# Patient Record
Sex: MALE
[De-identification: names, ages, dates, MRNs, and addresses within clinical notes are randomized per-mention and may not be internally consistent; named-entity substitution may affect disease eponyms.]

---

## 2017-11-20 ENCOUNTER — APPOINTMENT (OUTPATIENT)
Dept: PLASTIC SURGERY | Facility: CLINIC | Age: 62
End: 2017-11-20
Payer: COMMERCIAL

## 2017-11-20 VITALS
SYSTOLIC BLOOD PRESSURE: 156 MMHG | DIASTOLIC BLOOD PRESSURE: 96 MMHG | HEIGHT: 68 IN | HEART RATE: 83 BPM | TEMPERATURE: 98.1 F | WEIGHT: 166 LBS | BODY MASS INDEX: 25.16 KG/M2

## 2017-11-20 DIAGNOSIS — Z78.9 OTHER SPECIFIED HEALTH STATUS: ICD-10-CM

## 2017-11-20 DIAGNOSIS — Z83.3 FAMILY HISTORY OF DIABETES MELLITUS: ICD-10-CM

## 2017-11-20 DIAGNOSIS — Z80.6 FAMILY HISTORY OF LEUKEMIA: ICD-10-CM

## 2017-11-20 DIAGNOSIS — Z87.19 PERSONAL HISTORY OF OTHER DISEASES OF THE DIGESTIVE SYSTEM: ICD-10-CM

## 2017-11-20 DIAGNOSIS — R73.03 PREDIABETES.: ICD-10-CM

## 2017-11-20 PROBLEM — Z00.00 ENCOUNTER FOR PREVENTIVE HEALTH EXAMINATION: Status: ACTIVE | Noted: 2017-11-20

## 2017-11-20 PROCEDURE — 99201 OFFICE OUTPATIENT NEW 10 MINUTES: CPT

## 2017-11-27 ENCOUNTER — LABORATORY RESULT (OUTPATIENT)
Age: 62
End: 2017-11-27

## 2017-11-27 ENCOUNTER — APPOINTMENT (OUTPATIENT)
Dept: PLASTIC SURGERY | Facility: CLINIC | Age: 62
End: 2017-11-27
Payer: COMMERCIAL

## 2017-11-27 PROCEDURE — 21011 EXC FACE LES SC <2 CM: CPT

## 2017-12-04 ENCOUNTER — APPOINTMENT (OUTPATIENT)
Dept: PLASTIC SURGERY | Facility: CLINIC | Age: 62
End: 2017-12-04
Payer: COMMERCIAL

## 2017-12-04 PROCEDURE — 99024 POSTOP FOLLOW-UP VISIT: CPT

## 2018-01-22 ENCOUNTER — APPOINTMENT (OUTPATIENT)
Dept: PLASTIC SURGERY | Facility: CLINIC | Age: 63
End: 2018-01-22
Payer: COMMERCIAL

## 2018-01-22 PROCEDURE — 99024 POSTOP FOLLOW-UP VISIT: CPT

## 2018-04-23 ENCOUNTER — APPOINTMENT (OUTPATIENT)
Dept: PLASTIC SURGERY | Facility: CLINIC | Age: 63
End: 2018-04-23
Payer: COMMERCIAL

## 2018-04-23 DIAGNOSIS — D49.89 NEOPLASM OF UNSPECIFIED BEHAVIOR OF OTHER SPECIFIED SITES: ICD-10-CM

## 2018-04-23 PROCEDURE — 99211 OFF/OP EST MAY X REQ PHY/QHP: CPT

## 2021-03-01 ENCOUNTER — TRANSCRIPTION ENCOUNTER (OUTPATIENT)
Age: 66
End: 2021-03-01

## 2023-08-25 ENCOUNTER — INPATIENT (INPATIENT)
Facility: HOSPITAL | Age: 68
LOS: 6 days | Discharge: HOME HEALTH | DRG: 871 | End: 2023-09-01
Attending: INTERNAL MEDICINE | Admitting: INTERNAL MEDICINE
Payer: COMMERCIAL

## 2023-08-25 VITALS
DIASTOLIC BLOOD PRESSURE: 86 MMHG | WEIGHT: 156.09 LBS | HEART RATE: 100 BPM | HEIGHT: 68 IN | RESPIRATION RATE: 18 BRPM | SYSTOLIC BLOOD PRESSURE: 126 MMHG | OXYGEN SATURATION: 94 % | TEMPERATURE: 100 F

## 2023-08-25 DIAGNOSIS — E04.1 NONTOXIC SINGLE THYROID NODULE: ICD-10-CM

## 2023-08-25 DIAGNOSIS — N28.9 DISORDER OF KIDNEY AND URETER, UNSPECIFIED: ICD-10-CM

## 2023-08-25 DIAGNOSIS — R74.01 ELEVATION OF LEVELS OF LIVER TRANSAMINASE LEVELS: ICD-10-CM

## 2023-08-25 DIAGNOSIS — K75.0 ABSCESS OF LIVER: ICD-10-CM

## 2023-08-25 DIAGNOSIS — A41.9 SEPSIS, UNSPECIFIED ORGANISM: ICD-10-CM

## 2023-08-25 LAB
ALBUMIN SERPL ELPH-MCNC: 4.1 G/DL — SIGNIFICANT CHANGE UP (ref 3.3–5)
ALP SERPL-CCNC: 386 U/L — HIGH (ref 40–120)
ALT FLD-CCNC: 107 U/L — HIGH (ref 10–45)
ANION GAP SERPL CALC-SCNC: 17 MMOL/L — SIGNIFICANT CHANGE UP (ref 5–17)
APPEARANCE UR: CLEAR — SIGNIFICANT CHANGE UP
APTT BLD: 31.6 SEC — SIGNIFICANT CHANGE UP (ref 24.5–35.6)
AST SERPL-CCNC: 49 U/L — HIGH (ref 10–40)
BACTERIA # UR AUTO: NEGATIVE — SIGNIFICANT CHANGE UP
BASE EXCESS BLDV CALC-SCNC: 4.8 MMOL/L — HIGH (ref -2–3)
BASOPHILS # BLD AUTO: 0.06 K/UL — SIGNIFICANT CHANGE UP (ref 0–0.2)
BASOPHILS NFR BLD AUTO: 0.4 % — SIGNIFICANT CHANGE UP (ref 0–2)
BILIRUB SERPL-MCNC: 0.6 MG/DL — SIGNIFICANT CHANGE UP (ref 0.2–1.2)
BILIRUB UR-MCNC: NEGATIVE — SIGNIFICANT CHANGE UP
BUN SERPL-MCNC: 14 MG/DL — SIGNIFICANT CHANGE UP (ref 7–23)
CA-I SERPL-SCNC: 1.15 MMOL/L — SIGNIFICANT CHANGE UP (ref 1.15–1.33)
CALCIUM SERPL-MCNC: 10.1 MG/DL — SIGNIFICANT CHANGE UP (ref 8.4–10.5)
CHLORIDE BLDV-SCNC: 96 MMOL/L — SIGNIFICANT CHANGE UP (ref 96–108)
CHLORIDE SERPL-SCNC: 91 MMOL/L — LOW (ref 96–108)
CO2 BLDV-SCNC: 31 MMOL/L — HIGH (ref 22–26)
CO2 SERPL-SCNC: 27 MMOL/L — SIGNIFICANT CHANGE UP (ref 22–31)
COLOR SPEC: SIGNIFICANT CHANGE UP
CREAT SERPL-MCNC: 1.19 MG/DL — SIGNIFICANT CHANGE UP (ref 0.5–1.3)
DIFF PNL FLD: NEGATIVE — SIGNIFICANT CHANGE UP
EGFR: 67 ML/MIN/1.73M2 — SIGNIFICANT CHANGE UP
EOSINOPHIL # BLD AUTO: 0.01 K/UL — SIGNIFICANT CHANGE UP (ref 0–0.5)
EOSINOPHIL NFR BLD AUTO: 0.1 % — SIGNIFICANT CHANGE UP (ref 0–6)
EPI CELLS # UR: 1 /HPF — SIGNIFICANT CHANGE UP
GAS PNL BLDV: 132 MMOL/L — LOW (ref 136–145)
GAS PNL BLDV: SIGNIFICANT CHANGE UP
GAS PNL BLDV: SIGNIFICANT CHANGE UP
GLUCOSE BLDV-MCNC: 152 MG/DL — HIGH (ref 70–99)
GLUCOSE SERPL-MCNC: 162 MG/DL — HIGH (ref 70–99)
GLUCOSE UR QL: NEGATIVE — SIGNIFICANT CHANGE UP
HCO3 BLDV-SCNC: 30 MMOL/L — HIGH (ref 22–29)
HCT VFR BLD CALC: 39 % — SIGNIFICANT CHANGE UP (ref 39–50)
HCT VFR BLDA CALC: 40 % — SIGNIFICANT CHANGE UP (ref 39–51)
HGB BLD CALC-MCNC: 13.3 G/DL — SIGNIFICANT CHANGE UP (ref 12.6–17.4)
HGB BLD-MCNC: 13 G/DL — SIGNIFICANT CHANGE UP (ref 13–17)
HIV 1 & 2 AB SERPL IA.RAPID: SIGNIFICANT CHANGE UP
IMM GRANULOCYTES NFR BLD AUTO: 1.4 % — HIGH (ref 0–0.9)
INR BLD: 1.23 RATIO — HIGH (ref 0.85–1.18)
KETONES UR-MCNC: SIGNIFICANT CHANGE UP
LACTATE BLDV-MCNC: 1.2 MMOL/L — SIGNIFICANT CHANGE UP (ref 0.5–2)
LEUKOCYTE ESTERASE UR-ACNC: NEGATIVE — SIGNIFICANT CHANGE UP
LYMPHOCYTES # BLD AUTO: 1.24 K/UL — SIGNIFICANT CHANGE UP (ref 1–3.3)
LYMPHOCYTES # BLD AUTO: 7.8 % — LOW (ref 13–44)
MCHC RBC-ENTMCNC: 28.1 PG — SIGNIFICANT CHANGE UP (ref 27–34)
MCHC RBC-ENTMCNC: 33.3 GM/DL — SIGNIFICANT CHANGE UP (ref 32–36)
MCV RBC AUTO: 84.4 FL — SIGNIFICANT CHANGE UP (ref 80–100)
MONOCYTES # BLD AUTO: 1.29 K/UL — HIGH (ref 0–0.9)
MONOCYTES NFR BLD AUTO: 8.2 % — SIGNIFICANT CHANGE UP (ref 2–14)
NEUTROPHILS # BLD AUTO: 13 K/UL — HIGH (ref 1.8–7.4)
NEUTROPHILS NFR BLD AUTO: 82.1 % — HIGH (ref 43–77)
NITRITE UR-MCNC: NEGATIVE — SIGNIFICANT CHANGE UP
NRBC # BLD: 0 /100 WBCS — SIGNIFICANT CHANGE UP (ref 0–0)
PCO2 BLDV: 45 MMHG — SIGNIFICANT CHANGE UP (ref 42–55)
PH BLDV: 7.43 — SIGNIFICANT CHANGE UP (ref 7.32–7.43)
PH UR: 6.5 — SIGNIFICANT CHANGE UP (ref 5–8)
PLATELET # BLD AUTO: 475 K/UL — HIGH (ref 150–400)
PO2 BLDV: 20 MMHG — LOW (ref 25–45)
POTASSIUM BLDV-SCNC: 3.5 MMOL/L — SIGNIFICANT CHANGE UP (ref 3.5–5.1)
POTASSIUM SERPL-MCNC: 3.5 MMOL/L — SIGNIFICANT CHANGE UP (ref 3.5–5.3)
POTASSIUM SERPL-SCNC: 3.5 MMOL/L — SIGNIFICANT CHANGE UP (ref 3.5–5.3)
PROT SERPL-MCNC: 8.7 G/DL — HIGH (ref 6–8.3)
PROT UR-MCNC: ABNORMAL
PROTHROM AB SERPL-ACNC: 13.4 SEC — HIGH (ref 9.5–13)
RAPID RVP RESULT: SIGNIFICANT CHANGE UP
RBC # BLD: 4.62 M/UL — SIGNIFICANT CHANGE UP (ref 4.2–5.8)
RBC # FLD: 13.2 % — SIGNIFICANT CHANGE UP (ref 10.3–14.5)
RBC CASTS # UR COMP ASSIST: 1 /HPF — SIGNIFICANT CHANGE UP (ref 0–4)
SAO2 % BLDV: 24 % — LOW (ref 67–88)
SARS-COV-2 RNA SPEC QL NAA+PROBE: SIGNIFICANT CHANGE UP
SODIUM SERPL-SCNC: 135 MMOL/L — SIGNIFICANT CHANGE UP (ref 135–145)
SP GR SPEC: 1.01 — LOW (ref 1.01–1.02)
TROPONIN T, HIGH SENSITIVITY RESULT: 18 NG/L — SIGNIFICANT CHANGE UP (ref 0–51)
UROBILINOGEN FLD QL: NEGATIVE — SIGNIFICANT CHANGE UP
WBC # BLD: 15.82 K/UL — HIGH (ref 3.8–10.5)
WBC # FLD AUTO: 15.82 K/UL — HIGH (ref 3.8–10.5)
WBC UR QL: 3 /HPF — SIGNIFICANT CHANGE UP (ref 0–5)

## 2023-08-25 PROCEDURE — 99285 EMERGENCY DEPT VISIT HI MDM: CPT

## 2023-08-25 PROCEDURE — 99223 1ST HOSP IP/OBS HIGH 75: CPT

## 2023-08-25 PROCEDURE — 71260 CT THORAX DX C+: CPT | Mod: 26

## 2023-08-25 PROCEDURE — 74177 CT ABD & PELVIS W/CONTRAST: CPT | Mod: 26

## 2023-08-25 RX ORDER — ACETAMINOPHEN 500 MG
975 TABLET ORAL ONCE
Refills: 0 | Status: COMPLETED | OUTPATIENT
Start: 2023-08-25 | End: 2023-08-25

## 2023-08-25 RX ORDER — PIPERACILLIN AND TAZOBACTAM 4; .5 G/20ML; G/20ML
3.38 INJECTION, POWDER, LYOPHILIZED, FOR SOLUTION INTRAVENOUS ONCE
Refills: 0 | Status: DISCONTINUED | OUTPATIENT
Start: 2023-08-25 | End: 2023-08-25

## 2023-08-25 RX ORDER — PIPERACILLIN AND TAZOBACTAM 4; .5 G/20ML; G/20ML
3.38 INJECTION, POWDER, LYOPHILIZED, FOR SOLUTION INTRAVENOUS ONCE
Refills: 0 | Status: COMPLETED | OUTPATIENT
Start: 2023-08-25 | End: 2023-08-25

## 2023-08-25 RX ORDER — PIPERACILLIN AND TAZOBACTAM 4; .5 G/20ML; G/20ML
3.38 INJECTION, POWDER, LYOPHILIZED, FOR SOLUTION INTRAVENOUS EVERY 8 HOURS
Refills: 0 | Status: DISCONTINUED | OUTPATIENT
Start: 2023-08-25 | End: 2023-08-30

## 2023-08-25 RX ORDER — SODIUM CHLORIDE 9 MG/ML
2100 INJECTION, SOLUTION INTRAVENOUS ONCE
Refills: 0 | Status: COMPLETED | OUTPATIENT
Start: 2023-08-25 | End: 2023-08-25

## 2023-08-25 RX ADMIN — Medication 975 MILLIGRAM(S): at 18:33

## 2023-08-25 RX ADMIN — PIPERACILLIN AND TAZOBACTAM 25 GRAM(S): 4; .5 INJECTION, POWDER, LYOPHILIZED, FOR SOLUTION INTRAVENOUS at 22:38

## 2023-08-25 RX ADMIN — PIPERACILLIN AND TAZOBACTAM 3.38 GRAM(S): 4; .5 INJECTION, POWDER, LYOPHILIZED, FOR SOLUTION INTRAVENOUS at 18:33

## 2023-08-25 RX ADMIN — PIPERACILLIN AND TAZOBACTAM 200 GRAM(S): 4; .5 INJECTION, POWDER, LYOPHILIZED, FOR SOLUTION INTRAVENOUS at 17:27

## 2023-08-25 RX ADMIN — SODIUM CHLORIDE 2100 MILLILITER(S): 9 INJECTION, SOLUTION INTRAVENOUS at 17:26

## 2023-08-25 RX ADMIN — SODIUM CHLORIDE 2100 MILLILITER(S): 9 INJECTION, SOLUTION INTRAVENOUS at 18:33

## 2023-08-25 RX ADMIN — Medication 975 MILLIGRAM(S): at 17:27

## 2023-08-25 NOTE — H&P ADULT - NSHPREVIEWOFSYSTEMS_GEN_ALL_CORE
REVIEW OF SYSTEMS:  CONSTITUTIONAL: +weakness. +fevers. +chills. No rigors. +poor appetite.  EYES: No blurry or double vision. No eye pain.  ENT: No hearing difficulty. No sore throat. No Sinusitis/rhinorrhea.   NECK: No pain. No stiffness/rigidity.  CARDIAC: No chest pain. No palpitations. No lightheadedness. No syncope.  RESPIRATORY: No cough. No SOB.   GASTROINTESTINAL: +abdominal pain. +nausea. No vomiting. No diarrhea. No constipation.   GENITOURINARY: No dysuria. No frequency. No oliguria.  NEUROLOGICAL: No numbness/tingling. No focal weakness. No headache. No unsteady gait.  BACK: No back pain. No flank pain.  EXTREMITIES: No lower extremity edema. Full ROM. No joint pain.  SKIN: No rashes. No itching. No other lesions.  All other review of systems is negative unless indicated above.  Unless indicated above, unable to assess ROS 2/2

## 2023-08-25 NOTE — H&P ADULT - HISTORY OF PRESENT ILLNESS
68M c no signif PMHx, pw 3 weeks of fatigue, and 2 weeks of epigastric abd pain, worsening fevers, night sweats, poor appetite.    Pt states he initially had right lower back pain but that self resolved. Starting 2 weeks ago, started to have low grade fevers, epigastric pain, night sweats, poor appetite, fatigue. All of his symptoms have been getting worse. Pt had outpt CT scan that reportedly showed "intrahepatic masses". Pt went to see his GI doctor today, and then was told by his PMD to come to the hospital. Pt reports living a "boring life". Pt denies travel out of the country, swimming in fresh water bodies, hiking, exotic pets, exotic hobbies. Pt states he has a dog, rarely eats red meat or sea food, denies drug use. Last colonoscopy was 3 yrs ago and reportedly normal.

## 2023-08-25 NOTE — H&P ADULT - PROBLEM SELECTOR PLAN 1
- complex cysts suspicious for liver abscesses  - unclear etiol  - will check hiv, quant gold, entambeba labs, blood cultures, cea, ca199  - needs ID consult in AM  - may need diagnostic IR drainage?  - cont empiric zosyn for now

## 2023-08-25 NOTE — H&P ADULT - NSHPLABSRESULTS_GEN_ALL_CORE
Personally reviewed old records.  Personally reviewed labs.  Personally reviewed imaging.  Personally reviewed EKG. sinus tach rate 102 qtc 437. no st or tw changes                          13.0   15.82 )-----------( 475      ( 25 Aug 2023 17:08 )             39.0       08    135  |  91<L>  |  14  ----------------------------<  162<H>  3.5   |  27  |  1.19    Ca    10.1      25 Aug 2023 17:08    TPro  8.7<H>  /  Alb  4.1  /  TBili  0.6  /  DBili  x   /  AST  49<H>  /  ALT  107<H>  /  AlkPhos  386<H>  08            LIVER FUNCTIONS - ( 25 Aug 2023 17:08 )  Alb: 4.1 g/dL / Pro: 8.7 g/dL / ALK PHOS: 386 U/L / ALT: 107 U/L / AST: 49 U/L / GGT: x             PT/INR - ( 25 Aug 2023 17:08 )   PT: 13.4 sec;   INR: 1.23 ratio         PTT - ( 25 Aug 2023 17:08 )  PTT:31.6 sec    Urinalysis Basic - ( 25 Aug 2023 19:54 )    Color: Light Yellow / Appearance: Clear / S.009 / pH: x  Gluc: x / Ketone: Trace  / Bili: Negative / Urobili: Negative   Blood: x / Protein: 30 mg/dL / Nitrite: Negative   Leuk Esterase: Negative / RBC: 1 /hpf / WBC 3 /HPF   Sq Epi: x / Non Sq Epi: x / Bacteria: Negative

## 2023-08-25 NOTE — ED PROVIDER NOTE - OBJECTIVE STATEMENT
William Corbett is a 68 y.o. M PMHx significant for HLD, who presents to the ED with c.o. epigastric pain, fatigue, and night sweats.  Per pt, symptoms initially started with back pain approximately 3 weeks ago that he localized to the R lower back.  He reports this pain did not radiate however was constant.  Pain has since decreased in intensity approximately 1 week following however, at that time developed sudden onset epigastric pain that has been constant, nonradiating, characterized as "dull", worse when laying down and rolling and has not had any particular alleviating factors.  During this time, Mr. Corbett reports increased fatigue and having a low-grade fever, with decreased appetite.  2/2 to ongoing symptoms, patient reported to his PCP who ordered CT A/P, positive for an "intrahepatic masses, largest in the lateral segment of the left hepatic lobe at 6.2 cm".  Today, while at his GI appointment, he was called by his PCP for elevated WBC and was recommended to present to the emergency department for management.

## 2023-08-25 NOTE — H&P ADULT - NSHPPHYSICALEXAM_GEN_ALL_CORE
PHYSICAL EXAM:   GENERAL: Alert. Not confused. No acute distress. Not thin. Not cachectic. Not obese.  HEAD:  Atraumatic. Normocephalic.  EYES: EOMI. PERRLA. Normal conjunctiva/sclera.  ENT: Neck supple. No JVD. Moist oral mucosa. Not edentulous. No thrush.  LYMPH: Normal supraclavicular/cervical lymph nodes.   CARDIAC: Not tachy, Not asa. Regular rhythm. Not irregularly irregular. S1. S2. No murmur. No rub. No distant heart sounds.  LUNG/CHEST: CTAB. BS equal bilaterally. No wheezes. No rales. No rhonchi.  ABDOMEN: Soft. No tenderness. No distension. No fluid wave. Normal bowel sounds.  BACK: No midline/vertebral tenderness. No flank tenderness.  VASCULAR: +2 b/l radial or ulnar pulses. Palpable DP pulses.  EXTREMITIES:  No clubbing. No cyanosis. No edema. Moving all 4.  NEUROLOGY: A&Ox3. Non-focal exam. Cranial nerves intact. Normal speech. Sensation intact.  PSYCH: Normal behavior. Normal affect.  SKIN: No jaundice. No erythema. No rash/lesion.  ICU Vital Signs Last 24 Hrs  T(C): 37 (25 Aug 2023 21:20), Max: 39.3 (25 Aug 2023 16:50)  T(F): 98.6 (25 Aug 2023 21:20), Max: 102.8 (25 Aug 2023 16:50)  HR: 82 (25 Aug 2023 21:20) (81 - 108)  BP: 121/75 (25 Aug 2023 21:20) (121/75 - 144/89)  BP(mean): --  ABP: --  ABP(mean): --  RR: 18 (25 Aug 2023 21:20) (18 - 22)  SpO2: 96% (25 Aug 2023 21:20) (94% - 97%)    O2 Parameters below as of 25 Aug 2023 21:20  Patient On (Oxygen Delivery Method): room air          I&O's Summary

## 2023-08-25 NOTE — ED ADULT NURSE NOTE - HOW MANY DRINKS CONTAINING ALCOHOL DO YOU HAVE ON A TYPICAL DAY WHEN YOU ARE DRINKING?
Herbie from [facility] [employer] left vm at 2:51 pm. They need a diagnosis code for the lancets, meter and the control solution that was ordered.  They are able to take a verbal order.    Phone is [phone]  
Spoke to Herbie regarding dx code.   
1 or 2

## 2023-08-25 NOTE — ED PROVIDER NOTE - PROGRESS NOTE DETAILS
Spoke to colleague of Dr. Beltran.  They confirmed sending patient here for work-up of sepsis.  No recommendations at this time.  Informed that they will follow him after discharge for hepatic work-up regarding liver masses. Spoke to colleague of PCP Ravindra Carlin. Informed of current status of patient and admission for work up of Sepsis. Will admit under Dr. Ricardo Christianson

## 2023-08-25 NOTE — ED PROVIDER NOTE - CLINICAL SUMMARY MEDICAL DECISION MAKING FREE TEXT BOX
William Corbett is a 68 y.o. M PMHx significant for HLD, who presents to the ED with c.o. epigastric pain, fatigue, and night sweats. William Corbett is a 68 y.o. M PMHx significant for HLD, who presents to the ED with c.o. epigastric pain, fatigue, and night sweats. Patient was sent in by PMD and GI physician for sepsis work-up given WBC count of 18 K.  At ED per SIRS criteria patient is septic and sepsis work-up initiated.  Appropriate analgesic, IV antibiotics, and normal saline given.  Patient will be admitted for further management of sepsis.

## 2023-08-25 NOTE — ED ADULT NURSE NOTE - NSFALLUNIVINTERV_ED_ALL_ED
Bed/Stretcher in lowest position, wheels locked, appropriate side rails in place/Call bell, personal items and telephone in reach/Instruct patient to call for assistance before getting out of bed/chair/stretcher/Non-slip footwear applied when patient is off stretcher/[place] to call system/Physically safe environment - no spills, clutter or unnecessary equipment/Purposeful proactive rounding/Room/bathroom lighting operational, light cord in reach

## 2023-08-25 NOTE — ED ADULT NURSE NOTE - OBJECTIVE STATEMENT
68M aaox4 ambulatory with h/o HLD and BPH, sent by PMD for an abnormal CT scan results of the Liver and also patient reports that he's been having some on and off fever accompanied by back and abd pain for more than 2 weeks now which prompted him to see an MD. Reports elevated WBC from PMD office. Patient takes tylenol for fever, abdomen soft tender in the mid epigastric area. Denies any SOB, chest pain or urinary symptoms.  2 large bore IV access inserted, sepsis labs sent pending results. IV antibiotics and fluids started. Plan of care explained and verbalized understanding.

## 2023-08-25 NOTE — ED PROVIDER NOTE - ATTENDING CONTRIBUTION TO CARE
Attending Statement (ANEESH Zacarias MD):    HPI: 69y/o M with h/o HLD, c/o mid/upper abdominal pain for the past 2 weeks; states pain most painful after laying down at night (states can't get comfortable). No CP/SOB, no SOB with walking/exertion. No nausea/vomiting.  Also reports increased fatigue and having night sweats/chills for the past 1 week, worse past few days.  few episodes of diarrhea after oral contrast for CT scan performed yesterday.  Prior colonoscopy 3 years ago, noted diverticulosis but no other problems noted. Told to come to hospital due to elevated WBC and abnormalities on CT scan (multiple lesions/collections in liver)    PCP: Ravindra Carlin ([facility])  GI: Danilo Beltran    Review of Systems:  -General: no fever or + chills  -ENT: no congestion, no difficulty swallowing  -Pulmonary: no cough, no shortness of breath  -Cardiac: no chest pain, no palpitations  -Gastrointestinal: + abdominal pain, no nausea, no vomiting, and no diarrhea.  -Genitourinary: no blood or pain with urination  -Musculoskeletal: no back or neck pain  -Skin: no rashes  -Endocrine: No h/o diabetes   -Neurologic: No new weakness or numbness in extremities    All else negative unless otherwise specified elsewhere in this note.    PSH/PMH as noted above    On Physical Exam:  General: well appearing, in NAD, speaking clearly in full sentences and without difficulty; cooperative with exam  HEENT: anicteric sclera, MMM  Neck: no JVD  Cardiac: normal s1, s2; RRR; no MGR  Lungs: CTABL  Abdomen: soft nontender/nondistended  : no bladder tenderness or distension  Skin: intact, no rash  Extremities: no peripheral edema, no gross deformities  Neuro: no gross neurologic deficits    MDM: concern for possibel liver abscess given outpatient CT findings (imaging not available); febrile, eleated WBC here, starting empiric treatment for presumed sepsis; sending labs/cultures, consider hiv testing to exclude; empiric zosyn and plan for admission for ongoign evaluation/mangaement, possible ir guided biopsy/sampling of liver lesions; consider ct chest to eval for masses.

## 2023-08-25 NOTE — H&P ADULT - NSHPSOCIALHISTORY_GEN_ALL_CORE
Social History:    Marital Status: (x ) , (  ) Single, (  ) , (  ) , (  )   # of Children: 1  Lives with: (  ) alone, (  ) children, ( x ) spouse, (  ) parents, (  ) siblings, (  ) friends, (  ) other:   Occupation:     Substance Use/Illicit Drugs: (  ) never used vs other:   Tobacco Usage: ( x ) never smoked, (  ) former smoker, (  ) current smoker and Total Pack-Years:   Last Alcohol Usage/Frequency/Amount/Withdrawal/Hx of Abuse:  none  Foreign travel:   Animal exposure:

## 2023-08-26 LAB
CULTURE RESULTS: SIGNIFICANT CHANGE UP
SPECIMEN SOURCE: SIGNIFICANT CHANGE UP

## 2023-08-26 RX ADMIN — PIPERACILLIN AND TAZOBACTAM 25 GRAM(S): 4; .5 INJECTION, POWDER, LYOPHILIZED, FOR SOLUTION INTRAVENOUS at 21:00

## 2023-08-26 RX ADMIN — PIPERACILLIN AND TAZOBACTAM 25 GRAM(S): 4; .5 INJECTION, POWDER, LYOPHILIZED, FOR SOLUTION INTRAVENOUS at 06:41

## 2023-08-26 RX ADMIN — PIPERACILLIN AND TAZOBACTAM 25 GRAM(S): 4; .5 INJECTION, POWDER, LYOPHILIZED, FOR SOLUTION INTRAVENOUS at 13:08

## 2023-08-26 NOTE — CONSULT NOTE ADULT - SUBJECTIVE AND OBJECTIVE BOX
[facility], Division of Infectious Diseases  DIAMOND Moore S. Shah, Y. Patel, G. Rodney  [phone]  ([phone] - weekdays after 5pm and weekends)    JADA EDWARDS  68y, Male  [id number]    HPI--  HPI:  69 y/o M w/ no significant PMhs who presented w/ epigastric abd pain x 2 weeks w/ associated fevers, night sweats, poor appetite. Reports initially having right lower back pain but that self resolved. 3 weeks ago he began feeling fatigued and 2 weeks ago fevers and epigastric which has progressively been worsening. Pt had outpt CT scan that reportedly showed "intrahepatic masses" and was informed to present to the ED. He denies eating raw or undercooked foods, recent travel, recent dental work, fresh water exposures, hiking, camping, fishing.   Last colonoscopy was 3 yrs ago and reportedly normal.  Denies chest pain, n/v, diarrhea, myalgias, arthralgias  symptoms.   Reports having issues w/ back pain every couple of years.    ROS: 10 point review of systems completed, pertinent positives and negatives as per HPI.    Allergies: No Known Allergies    PMH -- Hyperlipidemia      PSH --   FH --   Social History -- denies tobacco, alcohol or illicit drug use    Physical Exam--  Vital Signs Last 24 Hrs  T(F): 99.6 (26 Aug 2023 09:03), Max: 102.8 (25 Aug 2023 16:50)  HR: 98 (26 Aug 2023 09:03) (81 - 108)  BP: 137/75 (26 Aug 2023 09:03) (104/65 - 144/89)  RR: 18 (26 Aug 2023 09:03) (18 - 22)  SpO2: 98% (26 Aug 2023 09:03) (92% - 98%)  General: nontoxic-appearing, no acute distress  HEENT: NC/AT, anicteric, no frontal, maxillary or ethmoid sinus tenderness   Neck: Not rigid. No LAD  Lungs: Clear bilaterally without rales  Heart: S1, S2, normal rate.   Abdomen: Soft. Nondistended. Nontender.   Neuro: AAOx3, no obvious focal deficits   Back: No costovertebral angle tenderness.  Extremities: No LE edema.   Skin: Warm. Dry. No rash.  Psychiatric: Appropriate affect and mood for situation.     Laboratory & Imaging Data--  CBC:                       13.0   15.82 )-----------( 475      ( 25 Aug 2023 17:08 )             39.0     WBC Count: 15.82 K/uL (23 @ 17:08)    CMP:     135  |  91<L>  |  14  ----------------------------<  162<H>  3.5   |  27  |  1.19    Ca    10.1      25 Aug 2023 17:08    TPro  8.7<H>  /  Alb  4.1  /  TBili  0.6  /  DBili  x   /  AST  49<H>  /  ALT  107<H>  /  AlkPhos  386<H>      LIVER FUNCTIONS - ( 25 Aug 2023 17:08 )  Alb: 4.1 g/dL / Pro: 8.7 g/dL / ALK PHOS: 386 U/L / ALT: 107 U/L / AST: 49 U/L / GGT: x           Urinalysis Basic - ( 25 Aug 2023 19:54 )    Color: Light Yellow / Appearance: Clear / S.009 / pH: x  Gluc: x / Ketone: Trace  / Bili: Negative / Urobili: Negative   Blood: x / Protein: 30 mg/dL / Nitrite: Negative   Leuk Esterase: Negative / RBC: 1 /hpf / WBC 3 /HPF   Sq Epi: x / Non Sq Epi: x / Bacteria: Negative      Microbiology:       Radiology--  ***  Active Medications--  piperacillin/tazobactam IVPB.. 3.375 Gram(s) IV Intermittent every 8 hours    Antimicrobials:   piperacillin/tazobactam IVPB.. 3.375 Gram(s) IV Intermittent every 8 hours    Immunologic:

## 2023-08-26 NOTE — PROGRESS NOTE ADULT - ASSESSMENT
68M c no signif PMHx, pw sepsis 2/2 likely liver abscesses of unclear etiol    Sepsis  Liver abscess.   ·  Plan: - complex cysts suspicious for liver abscesses  - unclear etiol  - will check hiv, quant gold, entambeba labs, blood cultures, cea, ca199  - ID following   - IR consult placed   - cont empiric zosyn for now.    Transaminitis.   ·  Plan: - likely 2/2 liver lesions as above  - hold statin for now.    Thyroid nodule.   ·  Plan: - will check thyroid u/s, TFTs  - f/u pmd and endo as outpt.    Renal lesion.   ·  Plan: - check renal u/s  - f/u pmd and renal as outpt.    dvt ppx: lovenox   gi ppx: dajuan Christianson MD   [facility]  [phone]

## 2023-08-26 NOTE — PROGRESS NOTE ADULT - SUBJECTIVE AND OBJECTIVE BOX
Patient is a 68y old  Male who presents with a chief complaint of fever, night sweats, epigastric abd pain (26 Aug 2023 13:43)      SUBJECTIVE / OVERNIGHT EVENTS:  Patient seen and examined.   Still with night sweats      Vital Signs Last 24 Hrs  T(C): 37.6 (26 Aug 2023 09:03), Max: 39.3 (25 Aug 2023 16:50)  T(F): 99.6 (26 Aug 2023 09:03), Max: 102.8 (25 Aug 2023 16:50)  HR: 98 (26 Aug 2023 09:03) (81 - 108)  BP: 137/75 (26 Aug 2023 09:03) (104/65 - 144/89)  BP(mean): --  RR: 18 (26 Aug 2023 09:03) (18 - 22)  SpO2: 98% (26 Aug 2023 09:03) (92% - 98%)    Parameters below as of 26 Aug 2023 09:03  Patient On (Oxygen Delivery Method): room air      I&O's Summary      PHYSICAL EXAM:  GENERAL: NAD, AAOx3  HEAD:  Atraumatic, Normocephalic  EYES: EOMI; conjunctiva and sclera clear  NECK: Supple, No JVD, No LAD  CHEST/LUNG: B/L air entry; No wheezes, rales or rhonci   HEART: Regular rate and rhythm; No murmurs, rubs, or gallops  ABDOMEN: Soft, Nontender, Nondistended; Bowel sounds present  EXTREMITIES:  2+ Peripheral Pulses, No clubbing, cyanosis, or edema  SKIN: No rashes or lesions    LABS:                        13.0   15.82 )-----------( 475      ( 25 Aug 2023 17:08 )             39.0     08-25    135  |  91<L>  |  14  ----------------------------<  162<H>  3.5   |  27  |  1.19    Ca    10.1      25 Aug 2023 17:08    TPro  8.7<H>  /  Alb  4.1  /  TBili  0.6  /  DBili  x   /  AST  49<H>  /  ALT  107<H>  /  AlkPhos  386<H>  08-25    PT/INR - ( 25 Aug 2023 17:08 )   PT: 13.4 sec;   INR: 1.23 ratio         PTT - ( 25 Aug 2023 17:08 )  PTT:31.6 sec  CAPILLARY BLOOD GLUCOSE            Urinalysis Basic - ( 25 Aug 2023 19:54 )    Color: Light Yellow / Appearance: Clear / S.009 / pH: x  Gluc: x / Ketone: Trace  / Bili: Negative / Urobili: Negative   Blood: x / Protein: 30 mg/dL / Nitrite: Negative   Leuk Esterase: Negative / RBC: 1 /hpf / WBC 3 /HPF   Sq Epi: x / Non Sq Epi: x / Bacteria: Negative        RADIOLOGY & ADDITIONAL TESTS:    Imaging Personally Reviewed:  [x] YES  [ ] NO    Consultant(s) Notes Reviewed:  [x] YES  [ ] NO      MEDICATIONS  (STANDING):  piperacillin/tazobactam IVPB.. 3.375 Gram(s) IV Intermittent every 8 hours    MEDICATIONS  (PRN):      Care Discussed with Consultants/Other Providers [x] YES  [ ] NO    HEALTH ISSUES - PROBLEM Dx:  Liver abscess    Sepsis    Transaminitis    Thyroid nodule    Renal lesion

## 2023-08-26 NOTE — CHART NOTE - NSCHARTNOTEFT_GEN_A_CORE
68M no PMH with abdominal pain and fevers, leukocytosis, found to have likely abscess on CT. IR consulted for drainage    Afebrile and HD stable on IV antibiotics in hospital.     On ASA at home, last 10/24.    Given stability, can perofrm drainage after ASA has been held for 5 days.   plan for  above procedure Tuesday . can put order for IR procedure under Dr Halaibeh.  NPO AMN for sedation  please recheck CBC BMP Coags 4 AM    if patient demonstrates signs of acute hemodynamic compensation unresponsive to resuscitation requiring escalation of level of care, please contact IR for re-evaluation for more urgent intervention ([facility] [id number], [phone])         Alex Becerril MD   Interventional Radiology Chief resident/PGY6  Contact on [employer] Teams for nonemergent issues    - Nonemergent consults:  [facility] order "Consult- Interventional Radiology", no page required  - Emergent issues (pager): [facility] [phone]; [facility] [phone]; [phone]; DO NOT PAGE FOR SCHEDULING QUESTIONS  - Scheduling questions 8am-6pm : [facility] [phone]; [facility] [phone],   - Clinic/outpatient booking: [facility] [phone]; [facility] [phone]
Chart reviewed  liver abscesses  clinically stable  agree w abx and IR drainage/sampling  full consult to follow

## 2023-08-27 LAB
ALBUMIN SERPL ELPH-MCNC: 3.4 G/DL — SIGNIFICANT CHANGE UP (ref 3.3–5)
ALBUMIN SERPL ELPH-MCNC: 3.6 G/DL — SIGNIFICANT CHANGE UP (ref 3.3–5)
ALP SERPL-CCNC: 351 U/L — HIGH (ref 40–120)
ALP SERPL-CCNC: 362 U/L — HIGH (ref 40–120)
ALT FLD-CCNC: 103 U/L — HIGH (ref 10–45)
ALT FLD-CCNC: 112 U/L — HIGH (ref 10–45)
ANION GAP SERPL CALC-SCNC: 13 MMOL/L — SIGNIFICANT CHANGE UP (ref 5–17)
ANION GAP SERPL CALC-SCNC: 15 MMOL/L — SIGNIFICANT CHANGE UP (ref 5–17)
AST SERPL-CCNC: 63 U/L — HIGH (ref 10–40)
AST SERPL-CCNC: 69 U/L — HIGH (ref 10–40)
BASOPHILS # BLD AUTO: 0.07 K/UL — SIGNIFICANT CHANGE UP (ref 0–0.2)
BASOPHILS NFR BLD AUTO: 0.4 % — SIGNIFICANT CHANGE UP (ref 0–2)
BILIRUB SERPL-MCNC: 0.6 MG/DL — SIGNIFICANT CHANGE UP (ref 0.2–1.2)
BILIRUB SERPL-MCNC: 0.7 MG/DL — SIGNIFICANT CHANGE UP (ref 0.2–1.2)
BUN SERPL-MCNC: 10 MG/DL — SIGNIFICANT CHANGE UP (ref 7–23)
BUN SERPL-MCNC: 11 MG/DL — SIGNIFICANT CHANGE UP (ref 7–23)
CALCIUM SERPL-MCNC: 9.1 MG/DL — SIGNIFICANT CHANGE UP (ref 8.4–10.5)
CALCIUM SERPL-MCNC: 9.2 MG/DL — SIGNIFICANT CHANGE UP (ref 8.4–10.5)
CANCER AG19-9 SERPL-ACNC: 6 U/ML — SIGNIFICANT CHANGE UP
CEA SERPL-MCNC: 1 NG/ML — SIGNIFICANT CHANGE UP (ref 0–3.8)
CHLORIDE SERPL-SCNC: 96 MMOL/L — SIGNIFICANT CHANGE UP (ref 96–108)
CHLORIDE SERPL-SCNC: 97 MMOL/L — SIGNIFICANT CHANGE UP (ref 96–108)
CO2 SERPL-SCNC: 24 MMOL/L — SIGNIFICANT CHANGE UP (ref 22–31)
CO2 SERPL-SCNC: 26 MMOL/L — SIGNIFICANT CHANGE UP (ref 22–31)
CREAT SERPL-MCNC: 1.23 MG/DL — SIGNIFICANT CHANGE UP (ref 0.5–1.3)
CREAT SERPL-MCNC: 1.28 MG/DL — SIGNIFICANT CHANGE UP (ref 0.5–1.3)
EGFR: 62 ML/MIN/1.73M2 — SIGNIFICANT CHANGE UP
EGFR: 65 ML/MIN/1.73M2 — SIGNIFICANT CHANGE UP
EOSINOPHIL # BLD AUTO: 0.06 K/UL — SIGNIFICANT CHANGE UP (ref 0–0.5)
EOSINOPHIL NFR BLD AUTO: 0.4 % — SIGNIFICANT CHANGE UP (ref 0–6)
GLUCOSE SERPL-MCNC: 158 MG/DL — HIGH (ref 70–99)
GLUCOSE SERPL-MCNC: 166 MG/DL — HIGH (ref 70–99)
HAV IGM SER-ACNC: SIGNIFICANT CHANGE UP
HBV CORE IGM SER-ACNC: SIGNIFICANT CHANGE UP
HBV SURFACE AG SER-ACNC: SIGNIFICANT CHANGE UP
HCT VFR BLD CALC: 33.3 % — LOW (ref 39–50)
HCT VFR BLD CALC: 34.4 % — LOW (ref 39–50)
HCV AB S/CO SERPL IA: 0.07 S/CO — SIGNIFICANT CHANGE UP (ref 0–0.99)
HCV AB S/CO SERPL IA: 0.07 S/CO — SIGNIFICANT CHANGE UP (ref 0–0.99)
HCV AB SERPL-IMP: SIGNIFICANT CHANGE UP
HCV AB SERPL-IMP: SIGNIFICANT CHANGE UP
HGB BLD-MCNC: 10.9 G/DL — LOW (ref 13–17)
HGB BLD-MCNC: 11.4 G/DL — LOW (ref 13–17)
IMM GRANULOCYTES NFR BLD AUTO: 1.5 % — HIGH (ref 0–0.9)
LYMPHOCYTES # BLD AUTO: 1.45 K/UL — SIGNIFICANT CHANGE UP (ref 1–3.3)
LYMPHOCYTES # BLD AUTO: 9.1 % — LOW (ref 13–44)
MAGNESIUM SERPL-MCNC: 2.4 MG/DL — SIGNIFICANT CHANGE UP (ref 1.6–2.6)
MCHC RBC-ENTMCNC: 28.2 PG — SIGNIFICANT CHANGE UP (ref 27–34)
MCHC RBC-ENTMCNC: 28.3 PG — SIGNIFICANT CHANGE UP (ref 27–34)
MCHC RBC-ENTMCNC: 32.7 GM/DL — SIGNIFICANT CHANGE UP (ref 32–36)
MCHC RBC-ENTMCNC: 33.1 GM/DL — SIGNIFICANT CHANGE UP (ref 32–36)
MCV RBC AUTO: 85.1 FL — SIGNIFICANT CHANGE UP (ref 80–100)
MCV RBC AUTO: 86.5 FL — SIGNIFICANT CHANGE UP (ref 80–100)
MONOCYTES # BLD AUTO: 1.38 K/UL — HIGH (ref 0–0.9)
MONOCYTES NFR BLD AUTO: 8.7 % — SIGNIFICANT CHANGE UP (ref 2–14)
NEUTROPHILS # BLD AUTO: 12.68 K/UL — HIGH (ref 1.8–7.4)
NEUTROPHILS NFR BLD AUTO: 79.9 % — HIGH (ref 43–77)
NRBC # BLD: 0 /100 WBCS — SIGNIFICANT CHANGE UP (ref 0–0)
NRBC # BLD: 0 /100 WBCS — SIGNIFICANT CHANGE UP (ref 0–0)
PHOSPHATE SERPL-MCNC: 3.1 MG/DL — SIGNIFICANT CHANGE UP (ref 2.5–4.5)
PLATELET # BLD AUTO: 464 K/UL — HIGH (ref 150–400)
PLATELET # BLD AUTO: 540 K/UL — HIGH (ref 150–400)
POTASSIUM SERPL-MCNC: 3.8 MMOL/L — SIGNIFICANT CHANGE UP (ref 3.5–5.3)
POTASSIUM SERPL-MCNC: 3.8 MMOL/L — SIGNIFICANT CHANGE UP (ref 3.5–5.3)
POTASSIUM SERPL-SCNC: 3.8 MMOL/L — SIGNIFICANT CHANGE UP (ref 3.5–5.3)
POTASSIUM SERPL-SCNC: 3.8 MMOL/L — SIGNIFICANT CHANGE UP (ref 3.5–5.3)
PROT SERPL-MCNC: 7.4 G/DL — SIGNIFICANT CHANGE UP (ref 6–8.3)
PROT SERPL-MCNC: 7.6 G/DL — SIGNIFICANT CHANGE UP (ref 6–8.3)
RBC # BLD: 3.85 M/UL — LOW (ref 4.2–5.8)
RBC # BLD: 4.04 M/UL — LOW (ref 4.2–5.8)
RBC # FLD: 13.3 % — SIGNIFICANT CHANGE UP (ref 10.3–14.5)
RBC # FLD: 13.4 % — SIGNIFICANT CHANGE UP (ref 10.3–14.5)
SODIUM SERPL-SCNC: 135 MMOL/L — SIGNIFICANT CHANGE UP (ref 135–145)
SODIUM SERPL-SCNC: 136 MMOL/L — SIGNIFICANT CHANGE UP (ref 135–145)
TSH SERPL-MCNC: 2.07 UIU/ML — SIGNIFICANT CHANGE UP (ref 0.27–4.2)
WBC # BLD: 15.1 K/UL — HIGH (ref 3.8–10.5)
WBC # BLD: 15.88 K/UL — HIGH (ref 3.8–10.5)
WBC # FLD AUTO: 15.1 K/UL — HIGH (ref 3.8–10.5)
WBC # FLD AUTO: 15.88 K/UL — HIGH (ref 3.8–10.5)

## 2023-08-27 PROCEDURE — 76536 US EXAM OF HEAD AND NECK: CPT | Mod: 26

## 2023-08-27 PROCEDURE — 76770 US EXAM ABDO BACK WALL COMP: CPT | Mod: 26

## 2023-08-27 RX ORDER — ACETAMINOPHEN 500 MG
650 TABLET ORAL ONCE
Refills: 0 | Status: COMPLETED | OUTPATIENT
Start: 2023-08-27 | End: 2023-08-27

## 2023-08-27 RX ADMIN — PIPERACILLIN AND TAZOBACTAM 25 GRAM(S): 4; .5 INJECTION, POWDER, LYOPHILIZED, FOR SOLUTION INTRAVENOUS at 04:59

## 2023-08-27 RX ADMIN — PIPERACILLIN AND TAZOBACTAM 25 GRAM(S): 4; .5 INJECTION, POWDER, LYOPHILIZED, FOR SOLUTION INTRAVENOUS at 14:08

## 2023-08-27 RX ADMIN — Medication 650 MILLIGRAM(S): at 16:18

## 2023-08-27 RX ADMIN — PIPERACILLIN AND TAZOBACTAM 25 GRAM(S): 4; .5 INJECTION, POWDER, LYOPHILIZED, FOR SOLUTION INTRAVENOUS at 21:38

## 2023-08-27 RX ADMIN — Medication 650 MILLIGRAM(S): at 17:25

## 2023-08-27 NOTE — PROGRESS NOTE ADULT - SUBJECTIVE AND OBJECTIVE BOX
[facility], Division of Infectious Diseases  DIAMOND Moore Y. Patel, S. Shah, G. Rodney  [phone]  ([phone] - weekdays after 5pm and weekends)    Name: JADA EDWARDS  Age/Gender: 68y Male  MRN: [id number]    Interval History:  Notes reviewed.   No concerning overnight events.  Afebrile.   denies chills, reports epigastric pain has improved but is still present    Allergies: No Known Allergies      Objective:  Vitals:   T(F): 99.1 (08-27-23 @ 09:27), Max: 99.9 (08-27-23 @ 05:32)  HR: 94 (08-27-23 @ 09:27) (94 - 96)  BP: 122/77 (08-27-23 @ 05:32) (113/67 - 122/77)  RR: 18 (08-27-23 @ 09:27) (18 - 18)  SpO2: 96% (08-27-23 @ 09:27) (93% - 96%)  Physical Examination:  General: no acute distress  HEENT: anicteric  Cardio: S1, S2, normal rate  Resp: clear to auscultation bilaterally  Abd: soft, NT, ND  Ext: no LE edema  Skin: warm, dry    Laboratory Studies:  CBC:                       10.9   15.88 )-----------( 540      ( 27 Aug 2023 08:06 )             33.3     WBC Trend:  15.88 08-27-23 @ 08:06  15.10 08-27-23 @ 07:09  15.82 08-25-23 @ 17:08    CMP: 08-27    136  |  97  |  10  ----------------------------<  166<H>  3.8   |  26  |  1.28    Ca    9.2      27 Aug 2023 08:06  Phos  3.1     08-27  Mg     2.4     08-27    TPro  7.6  /  Alb  3.6  /  TBili  0.7  /  DBili  x   /  AST  69<H>  /  ALT  112<H>  /  AlkPhos  362<H>  08-27      LIVER FUNCTIONS - ( 27 Aug 2023 08:06 )  Alb: 3.6 g/dL / Pro: 7.6 g/dL / ALK PHOS: 362 U/L / ALT: 112 U/L / AST: 69 U/L / GGT: x             Urinalysis Basic - ( 27 Aug 2023 08:06 )    Color: x / Appearance: x / SG: x / pH: x  Gluc: 166 mg/dL / Ketone: x  / Bili: x / Urobili: x   Blood: x / Protein: x / Nitrite: x   Leuk Esterase: x / RBC: x / WBC x   Sq Epi: x / Non Sq Epi: x / Bacteria: x      Microbiology: reviewed     Culture - Urine (collected 08-25-23 @ 19:54)  Source: Clean Catch Clean Catch (Midstream)  Final Report (08-26-23 @ 21:25):    <10,000 CFU/mL Normal Urogenital Sunita    Culture - Blood (collected 08-25-23 @ 16:55)  Source: .Blood Blood-Peripheral  Preliminary Report (08-26-23 @ 22:02):    No growth at 24 hours    Culture - Blood (collected 08-25-23 @ 16:40)  Source: .Blood Blood-Peripheral  Preliminary Report (08-26-23 @ 22:02):    No growth at 24 hours        Radiology: reviewed     Medications:  piperacillin/tazobactam IVPB.. 3.375 Gram(s) IV Intermittent every 8 hours    Antimicrobials:  piperacillin/tazobactam IVPB.. 3.375 Gram(s) IV Intermittent every 8 hours

## 2023-08-27 NOTE — PROGRESS NOTE ADULT - ASSESSMENT
68M c no signif PMHx, pw sepsis 2/2 likely liver abscesses of unclear etiol    Sepsis  Liver abscess.   ·  Plan: - complex cysts suspicious for liver abscesses  - unclear etiol  - will check hiv, quant gold, entambeba labs, blood cultures  - CEA and CA 19-9 wnl-- less likely malignant   - ID and GI  following   - IR consult placed --- asa on hold; plan for drainage on 08/29 tuesday.   - cont empiric zosyn for now.    Transaminitis.   ·  Plan: - likely 2/2 liver lesions as above  - hold statin for now.    Thyroid nodule.   ·  Plan: - will check thyroid u/s, TFTs  - f/u pmd and endo as outpt.    Renal lesion.   ·  Plan: - check renal u/s  - f/u pmd and renal as outpt.    dvt ppx: lovenox   gi ppx: diet     Ricardo Christianson MD   [facility]  [phone]

## 2023-08-27 NOTE — CONSULT NOTE ADULT - SUBJECTIVE AND OBJECTIVE BOX
Chief Complaint:  Patient is a 68y old  Male who presents with a chief complaint of fever, night sweats, epigastric abd pain (26 Aug 2023 14:40)      Date of service: 08-27-23 @ 10:07    HPI:    The patient is a 68 year old man with chronic low back pain presenting with fevers, epigastric pain. He denies low abdominal pain.  His last colonoscopy was 3 years ago. Reportedly normal.    The patient denies dysphagia, nausea and vomiting,  diarrhea, unintentional weight loss, change in bowel habits or NSAID use.      Allergies:  No Known Allergies      Home Medications:    Hospital Medications:  piperacillin/tazobactam IVPB.. 3.375 Gram(s) IV Intermittent every 8 hours      PMHX/PSHX:  Hyperlipidemia        Family history:      Social History:   Denies ethanol use.  Denies illicit drug use.    ROS:     General:  + fever  Eyes:  Good vision, no reported pain  ENT:  No sore throat, pain, runny nose, dysphagia  CV:  No pain, palpitations, hypo/hypertension  Resp:  No dyspnea, cough, tachypnea, wheezing  GI:  See HPI  :  No pain, bleeding, incontinence, nocturia  Muscle:  No pain, weakness  Neuro:  No weakness, tingling, memory problems  Psych:  No fatigue, insomnia, mood problems, depression  Endocrine:  No polyuria, polydipsia, cold/heat intolerance  Heme:  No petechiae, ecchymosis, easy bruisability  Integumentary:  No rash, edema      PHYSICAL EXAM:     GENERAL:  Appears stated age, well-groomed, well-nourished, no distress  HEENT:  NC/AT,  conjunctivae anicteric, clear and pink,   NECK: supple, trachea midline  CHEST:  Full & symmetric excursion, no increased effort, breath sounds clear  HEART:  Regular rhythm, no JVD  ABDOMEN:  Soft, non-tender, non-distended, normoactive bowel sounds,  no masses , no hepatosplenomegaly  EXTREMITIES:  no cyanosis,clubbing or edema  SKIN:  No rash, erythema, or, ecchymoses, no jaundice  NEURO:  Alert, non-focal, no asterixis  PSYCH: Appropriate affect, oriented to place and time  RECTAL: Deferred      Vital Signs:  Vital Signs Last 24 Hrs  T(C): 37.3 (27 Aug 2023 09:27), Max: 37.7 (27 Aug 2023 05:32)  T(F): 99.1 (27 Aug 2023 09:27), Max: 99.9 (27 Aug 2023 05:32)  HR: 94 (27 Aug 2023 09:27) (94 - 96)  BP: 122/77 (27 Aug 2023 05:32) (113/67 - 122/77)  BP(mean): --  RR: 18 (27 Aug 2023 09:27) (18 - 18)  SpO2: 96% (27 Aug 2023 09:27) (93% - 96%)    Parameters below as of 27 Aug 2023 09:27  Patient On (Oxygen Delivery Method): room air      Daily     Daily     LABS: Labs personally reviewed by me:                        10.9   15.88 )-----------( 540      ( 27 Aug 2023 08:06 )             33.3     08-27    136  |  97  |  10  ----------------------------<  166<H>  3.8   |  26  |  1.28    Ca    9.2      27 Aug 2023 08:06  Phos  3.1     08-27  Mg     2.4     08-27    TPro  7.6  /  Alb  3.6  /  TBili  0.7  /  DBili  x   /  AST  69<H>  /  ALT  112<H>  /  AlkPhos  362<H>  08-27    LIVER FUNCTIONS - ( 27 Aug 2023 08:06 )  Alb: 3.6 g/dL / Pro: 7.6 g/dL / ALK PHOS: 362 U/L / ALT: 112 U/L / AST: 69 U/L / GGT: x           PT/INR - ( 25 Aug 2023 17:08 )   PT: 13.4 sec;   INR: 1.23 ratio         PTT - ( 25 Aug 2023 17:08 )  PTT:31.6 sec  Urinalysis Basic - ( 27 Aug 2023 08:06 )    Color: x / Appearance: x / SG: x / pH: x  Gluc: 166 mg/dL / Ketone: x  / Bili: x / Urobili: x   Blood: x / Protein: x / Nitrite: x   Leuk Esterase: x / RBC: x / WBC x   Sq Epi: x / Non Sq Epi: x / Bacteria: x          Imaging personally reviewed by me:

## 2023-08-27 NOTE — PROGRESS NOTE ADULT - SUBJECTIVE AND OBJECTIVE BOX
Patient is a 68y old  Male who presents with a chief complaint of fever, night sweats, epigastric abd pain (27 Aug 2023 10:32)      SUBJECTIVE / OVERNIGHT EVENTS:  Patient seen and examined.   NO complaints.       Vital Signs Last 24 Hrs  T(C): 37.3 (27 Aug 2023 09:27), Max: 37.7 (27 Aug 2023 05:32)  T(F): 99.1 (27 Aug 2023 09:27), Max: 99.9 (27 Aug 2023 05:32)  HR: 94 (27 Aug 2023 09:27) (94 - 96)  BP: 122/77 (27 Aug 2023 05:32) (113/67 - 122/77)  BP(mean): --  RR: 18 (27 Aug 2023 09:27) (18 - 18)  SpO2: 96% (27 Aug 2023 09:27) (93% - 96%)    Parameters below as of 27 Aug 2023 09:27  Patient On (Oxygen Delivery Method): room air      I&O's Summary    26 Aug 2023 07:01  -  27 Aug 2023 07:00  --------------------------------------------------------  IN: 100 mL / OUT: 1 mL / NET: 99 mL    27 Aug 2023 07:01  -  27 Aug 2023 14:43  --------------------------------------------------------  IN: 100 mL / OUT: 0 mL / NET: 100 mL        PHYSICAL EXAM:  GENERAL: NAD, AAOx3  HEAD:  Atraumatic, Normocephalic  EYES: EOMI; conjunctiva and sclera clear  NECK: Supple, No JVD, No LAD  CHEST/LUNG: B/L air entry; No wheezes, rales or rhonci   HEART: Regular rate and rhythm; No murmurs, rubs, or gallops  ABDOMEN: Soft, Nontender, Nondistended; Bowel sounds present  EXTREMITIES:  2+ Peripheral Pulses, No clubbing, cyanosis, or edema  SKIN: No rashes or lesions    LABS:                        10.9   15.88 )-----------( 540      ( 27 Aug 2023 08:06 )             33.3     08-27    136  |  97  |  10  ----------------------------<  166<H>  3.8   |  26  |  1.28    Ca    9.2      27 Aug 2023 08:06  Phos  3.1     08-27  Mg     2.4     08-27    TPro  7.6  /  Alb  3.6  /  TBili  0.7  /  DBili  x   /  AST  69<H>  /  ALT  112<H>  /  AlkPhos  362<H>  08-27    PT/INR - ( 25 Aug 2023 17:08 )   PT: 13.4 sec;   INR: 1.23 ratio         PTT - ( 25 Aug 2023 17:08 )  PTT:31.6 sec  CAPILLARY BLOOD GLUCOSE            Urinalysis Basic - ( 27 Aug 2023 08:06 )    Color: x / Appearance: x / SG: x / pH: x  Gluc: 166 mg/dL / Ketone: x  / Bili: x / Urobili: x   Blood: x / Protein: x / Nitrite: x   Leuk Esterase: x / RBC: x / WBC x   Sq Epi: x / Non Sq Epi: x / Bacteria: x        RADIOLOGY & ADDITIONAL TESTS:    Imaging Personally Reviewed:  [x] YES  [ ] NO    Consultant(s) Notes Reviewed:  [x] YES  [ ] NO      MEDICATIONS  (STANDING):  piperacillin/tazobactam IVPB.. 3.375 Gram(s) IV Intermittent every 8 hours    MEDICATIONS  (PRN):      Care Discussed with Consultants/Other Providers [x] YES  [ ] NO    HEALTH ISSUES - PROBLEM Dx:  Liver abscess    Sepsis    Transaminitis    Thyroid nodule    Renal lesion

## 2023-08-27 NOTE — CONSULT NOTE ADULT - ASSESSMENT
69 y/o M w/ no significant PMhs who presented w/ epigastric abd pain x 2 weeks w/ associated fevers, night sweats, poor appetite.    hepatic abscesses  leukocytosis, elevated liver enzymes  CT abd- complex hepatic cystic lesion measuring 5.8 cm with closer sign, suggestive of an abscess. additional cystic lesions in the left hepatic lobe measuring up to 3.2 cm likely additional abscesses.  Recommendations  c/w zosyn  f/u blood cultures  would consult IR for drainage & send for gram stain and cultures    Dinh Stevens M.D.  [facility], Division of Infectious Diseases  [phone]  After 5pm on weekdays and all day on weekends - please call [phone] 
68 year old man with fever, epgiastric pain, CT imaging demonstrates liver abscesses    1. Liver abscess. Unclear precipitating factor.  -broad spectrum antibiotics  -IR aspiration  -appreciate ID    2. Colonic diverticulosis. No indication of diverticulitis.    3. Leukocytosis        Advanced care planning forms were discussed. Code status including forceful chest compressions, defibrillation and intubation were discussed. The risks benefits and alternatives to pertinent gastrointestinal procedures and interventions were discussed in detail and all questions were answered. Duration: 15 Minutes.    [facility]  Luther Castellano M.D.   [address], [place]  Office: [phone]

## 2023-08-27 NOTE — PROGRESS NOTE ADULT - ASSESSMENT
69 y/o M w/ no significant PMhs who presented w/ epigastric abd pain x 2 weeks w/ associated fevers, night sweats, poor appetite.    hepatic abscesses  leukocytosis, elevated liver enzymes  CT abd- complex hepatic cystic lesion measuring 5.8 cm with closer sign, suggestive of an abscess. additional cystic lesions in the left hepatic lobe measuring up to 3.2 cm likely additional abscesses.  Recommendations  c/w zosyn  f/u blood cultures  IR for drainage, please send for gram stain and cultures  trend wbc    discussed w/ medicine attending    Dinh Stevens M.D.  [facility], Division of Infectious Diseases  [phone]  After 5pm on weekdays and all day on weekends - please call [phone]

## 2023-08-28 LAB
HCT VFR BLD CALC: 35.2 % — LOW (ref 39–50)
HGB BLD-MCNC: 11.7 G/DL — LOW (ref 13–17)
MCHC RBC-ENTMCNC: 28.3 PG — SIGNIFICANT CHANGE UP (ref 27–34)
MCHC RBC-ENTMCNC: 33.2 GM/DL — SIGNIFICANT CHANGE UP (ref 32–36)
MCV RBC AUTO: 85 FL — SIGNIFICANT CHANGE UP (ref 80–100)
NRBC # BLD: 0 /100 WBCS — SIGNIFICANT CHANGE UP (ref 0–0)
PLATELET # BLD AUTO: 496 K/UL — HIGH (ref 150–400)
RBC # BLD: 4.14 M/UL — LOW (ref 4.2–5.8)
RBC # FLD: 13.2 % — SIGNIFICANT CHANGE UP (ref 10.3–14.5)
WBC # BLD: 14.58 K/UL — HIGH (ref 3.8–10.5)
WBC # FLD AUTO: 14.58 K/UL — HIGH (ref 3.8–10.5)

## 2023-08-28 RX ORDER — CHLORHEXIDINE GLUCONATE 213 G/1000ML
1 SOLUTION TOPICAL DAILY
Refills: 0 | Status: DISCONTINUED | OUTPATIENT
Start: 2023-08-28 | End: 2023-09-01

## 2023-08-28 RX ADMIN — PIPERACILLIN AND TAZOBACTAM 25 GRAM(S): 4; .5 INJECTION, POWDER, LYOPHILIZED, FOR SOLUTION INTRAVENOUS at 21:18

## 2023-08-28 RX ADMIN — CHLORHEXIDINE GLUCONATE 1 APPLICATION(S): 213 SOLUTION TOPICAL at 14:24

## 2023-08-28 RX ADMIN — PIPERACILLIN AND TAZOBACTAM 25 GRAM(S): 4; .5 INJECTION, POWDER, LYOPHILIZED, FOR SOLUTION INTRAVENOUS at 05:41

## 2023-08-28 RX ADMIN — PIPERACILLIN AND TAZOBACTAM 25 GRAM(S): 4; .5 INJECTION, POWDER, LYOPHILIZED, FOR SOLUTION INTRAVENOUS at 14:23

## 2023-08-28 NOTE — PROGRESS NOTE ADULT - SUBJECTIVE AND OBJECTIVE BOX
Chief Complaint:  Patient is a 68y old  Male who presents with a chief complaint of fever, night sweats, epigastric abd pain (27 Aug 2023 14:43)      Date of service 08-28-23 @ 09:39      Interval Events:   low grade fever  Hospital Medications:  chlorhexidine 2% Cloths 1 Application(s) Topical daily  piperacillin/tazobactam IVPB.. 3.375 Gram(s) IV Intermittent every 8 hours        Review of Systems:  General:  No wt loss, fevers, chills, night sweats, fatigue,   Eyes:  Good vision, no reported pain  ENT:  No sore throat, pain, runny nose, dysphagia  CV:  No pain, palpitations, hypo/hypertension  Resp:  No dyspnea, cough, tachypnea, wheezing  GI:  See HPI  :  No pain, bleeding, incontinence, nocturia  Muscle:  No pain, weakness  Neuro:  No weakness, tingling, memory problems  Psych:  No fatigue, insomnia, mood problems, depression  Endocrine:  No polyuria, polydipsia, cold/heat intolerance  Heme:  No petechiae, ecchymosis, easy bruisability  Integumentary:  No rash, edema    PHYSICAL EXAM:   Vital Signs:  Vital Signs Last 24 Hrs  T(C): 37.5 (28 Aug 2023 06:54), Max: 38.1 (27 Aug 2023 15:08)  T(F): 99.5 (28 Aug 2023 06:54), Max: 100.5 (27 Aug 2023 15:08)  HR: 90 (28 Aug 2023 06:54) (90 - 95)  BP: 117/78 (28 Aug 2023 06:54) (117/78 - 124/73)  BP(mean): --  RR: 18 (28 Aug 2023 06:54) (18 - 18)  SpO2: 95% (28 Aug 2023 06:54) (95% - 95%)    Parameters below as of 28 Aug 2023 06:54  Patient On (Oxygen Delivery Method): room air      Daily     Daily       PHYSICAL EXAM:     GENERAL:  Appears stated age, well-groomed, well-nourished, no distress  HEENT:  NC/AT,  conjunctivae anicteric, clear and pink,   NECK: supple, trachea midline  CHEST:  Full & symmetric excursion, no increased effort, breath sounds clear  HEART:  Regular rhythm, no JVD  ABDOMEN:  Soft, non-tender, non-distended, normoactive bowel sounds,  no masses , no hepatosplenomegaly  EXTREMITIES:  no cyanosis,clubbing or edema  SKIN:  No rash, erythema, or, ecchymoses, no jaundice  NEURO:  Alert, non-focal, no asterixis  PSYCH: Appropriate affect, oriented to place and time  RECTAL: Deferred      LABS Personally reviewed by me:                        11.7   14.58 )-----------( 496      ( 28 Aug 2023 07:03 )             35.2     Mean Cell Volume: 85.0 fl (08-28-23 @ 07:03)    08-27    136  |  97  |  10  ----------------------------<  166<H>  3.8   |  26  |  1.28    Ca    9.2      27 Aug 2023 08:06  Phos  3.1     08-27  Mg     2.4     08-27    TPro  7.6  /  Alb  3.6  /  TBili  0.7  /  DBili  x   /  AST  69<H>  /  ALT  112<H>  /  AlkPhos  362<H>  08-27    LIVER FUNCTIONS - ( 27 Aug 2023 08:06 )  Alb: 3.6 g/dL / Pro: 7.6 g/dL / ALK PHOS: 362 U/L / ALT: 112 U/L / AST: 69 U/L / GGT: x             Urinalysis Basic - ( 27 Aug 2023 08:06 )    Color: x / Appearance: x / SG: x / pH: x  Gluc: 166 mg/dL / Ketone: x  / Bili: x / Urobili: x   Blood: x / Protein: x / Nitrite: x   Leuk Esterase: x / RBC: x / WBC x   Sq Epi: x / Non Sq Epi: x / Bacteria: x                              11.7   14.58 )-----------( 496      ( 28 Aug 2023 07:03 )             35.2                         10.9   15.88 )-----------( 540      ( 27 Aug 2023 08:06 )             33.3                         11.4   15.10 )-----------( 464      ( 27 Aug 2023 07:09 )             34.4                         13.0   15.82 )-----------( 475      ( 25 Aug 2023 17:08 )             39.0       Imaging personally reviewed by me:

## 2023-08-28 NOTE — PROGRESS NOTE ADULT - ASSESSMENT
67 y/o M w/ no significant PMhs who presented w/ epigastric abd pain x 2 weeks w/ associated fevers, night sweats, poor appetite.    hepatic abscesses, fever  leukocytosis, elevated liver enzymes  CT abd- complex hepatic cystic lesion measuring 5.8 cm with closer sign, suggestive of an abscess. additional cystic lesions in the left hepatic lobe measuring up to 3.2 cm likely additional abscesses.  Recommendations  c/w zosyn  f/u blood cultures- NGTD  tentative plan for IR aspiration 8/29  please send for gram stain and cultures    Dinh Stevens M.D.  [facility], Division of Infectious Diseases  [phone]  After 5pm on weekdays and all day on weekends - please call [phone]

## 2023-08-28 NOTE — PROGRESS NOTE ADULT - ASSESSMENT
1. Liver abscess. Unclear precipitating factor.  -broad spectrum antibiotics  -IR aspiration  -appreciate ID    2. Colonic diverticulosis. No indication of diverticulitis.    3. Leukocytosis

## 2023-08-28 NOTE — PROGRESS NOTE ADULT - SUBJECTIVE AND OBJECTIVE BOX
[facility], Division of Infectious Diseases  DIAMOND Moore Y. Patel, S. Shah, G. Rodney  [phone]  ([phone] - weekdays after 5pm and weekends)    Name: JADA EDWARDS  Age/Gender: 68y Male  MRN: [id number]    Interval History:  Notes reviewed.   No concerning overnight events.  Afebrile.   reports epigastric pain decreasing  no more chills    Allergies: No Known Allergies      Objective:  Vitals:   T(F): 99.5 (08-28-23 @ 06:54), Max: 100.5 (08-27-23 @ 15:08)  HR: 90 (08-28-23 @ 06:54) (90 - 95)  BP: 117/78 (08-28-23 @ 06:54) (117/78 - 124/73)  RR: 18 (08-28-23 @ 06:54) (18 - 18)  SpO2: 95% (08-28-23 @ 06:54) (95% - 95%)  Physical Examination:  General: no acute distress  HEENT: anicteric  Cardio: S1, S2, normal rate  Resp: clear to auscultation bilaterally  Abd: soft, NT, ND  Ext: no LE edema  Skin: warm, dry    Laboratory Studies:  CBC:                       11.7   14.58 )-----------( 496      ( 28 Aug 2023 07:03 )             35.2     WBC Trend:  14.58 08-28-23 @ 07:03  15.88 08-27-23 @ 08:06  15.10 08-27-23 @ 07:09  15.82 08-25-23 @ 17:08    CMP: 08-27    136  |  97  |  10  ----------------------------<  166<H>  3.8   |  26  |  1.28    Ca    9.2      27 Aug 2023 08:06  Phos  3.1     08-27  Mg     2.4     08-27    TPro  7.6  /  Alb  3.6  /  TBili  0.7  /  DBili  x   /  AST  69<H>  /  ALT  112<H>  /  AlkPhos  362<H>  08-27      LIVER FUNCTIONS - ( 27 Aug 2023 08:06 )  Alb: 3.6 g/dL / Pro: 7.6 g/dL / ALK PHOS: 362 U/L / ALT: 112 U/L / AST: 69 U/L / GGT: x             Urinalysis Basic - ( 27 Aug 2023 08:06 )    Color: x / Appearance: x / SG: x / pH: x  Gluc: 166 mg/dL / Ketone: x  / Bili: x / Urobili: x   Blood: x / Protein: x / Nitrite: x   Leuk Esterase: x / RBC: x / WBC x   Sq Epi: x / Non Sq Epi: x / Bacteria: x      Microbiology: reviewed     Culture - Urine (collected 08-25-23 @ 19:54)  Source: Clean Catch Clean Catch (Midstream)  Final Report (08-26-23 @ 21:25):    <10,000 CFU/mL Normal Urogenital Sunita    Culture - Blood (collected 08-25-23 @ 16:55)  Source: .Blood Blood-Peripheral  Preliminary Report (08-27-23 @ 22:03):    No growth at 48 Hours    Culture - Blood (collected 08-25-23 @ 16:40)  Source: .Blood Blood-Peripheral  Preliminary Report (08-27-23 @ 22:03):    No growth at 48 Hours        Radiology: reviewed     Medications:  chlorhexidine 2% Cloths 1 Application(s) Topical daily  piperacillin/tazobactam IVPB.. 3.375 Gram(s) IV Intermittent every 8 hours    Antimicrobials:  piperacillin/tazobactam IVPB.. 3.375 Gram(s) IV Intermittent every 8 hours   [facility], Division of Infectious Diseases  DIAMOND Moore Y. Patel, S. Shah, G. Rodney  [phone]  ([phone] - weekdays after 5pm and weekends)    Name: JADA EDWARDS  Age/Gender: 68y Male  MRN: [id number]    Interval History:  Notes reviewed.   No concerning overnight events.  febrile yesterday afternoon to 100.5  reports epigastric pain decreasing  no more chills    Allergies: No Known Allergies      Objective:  Vitals:   T(F): 99.5 (08-28-23 @ 06:54), Max: 100.5 (08-27-23 @ 15:08)  HR: 90 (08-28-23 @ 06:54) (90 - 95)  BP: 117/78 (08-28-23 @ 06:54) (117/78 - 124/73)  RR: 18 (08-28-23 @ 06:54) (18 - 18)  SpO2: 95% (08-28-23 @ 06:54) (95% - 95%)  Physical Examination:  General: no acute distress  HEENT: anicteric  Cardio: S1, S2, normal rate  Resp: clear to auscultation bilaterally  Abd: soft, NT, ND  Ext: no LE edema  Skin: warm, dry    Laboratory Studies:  CBC:                       11.7   14.58 )-----------( 496      ( 28 Aug 2023 07:03 )             35.2     WBC Trend:  14.58 08-28-23 @ 07:03  15.88 08-27-23 @ 08:06  15.10 08-27-23 @ 07:09  15.82 08-25-23 @ 17:08    CMP: 08-27    136  |  97  |  10  ----------------------------<  166<H>  3.8   |  26  |  1.28    Ca    9.2      27 Aug 2023 08:06  Phos  3.1     08-27  Mg     2.4     08-27    TPro  7.6  /  Alb  3.6  /  TBili  0.7  /  DBili  x   /  AST  69<H>  /  ALT  112<H>  /  AlkPhos  362<H>  08-27      LIVER FUNCTIONS - ( 27 Aug 2023 08:06 )  Alb: 3.6 g/dL / Pro: 7.6 g/dL / ALK PHOS: 362 U/L / ALT: 112 U/L / AST: 69 U/L / GGT: x             Urinalysis Basic - ( 27 Aug 2023 08:06 )    Color: x / Appearance: x / SG: x / pH: x  Gluc: 166 mg/dL / Ketone: x  / Bili: x / Urobili: x   Blood: x / Protein: x / Nitrite: x   Leuk Esterase: x / RBC: x / WBC x   Sq Epi: x / Non Sq Epi: x / Bacteria: x      Microbiology: reviewed     Culture - Urine (collected 08-25-23 @ 19:54)  Source: Clean Catch Clean Catch (Midstream)  Final Report (08-26-23 @ 21:25):    <10,000 CFU/mL Normal Urogenital Sunita    Culture - Blood (collected 08-25-23 @ 16:55)  Source: .Blood Blood-Peripheral  Preliminary Report (08-27-23 @ 22:03):    No growth at 48 Hours    Culture - Blood (collected 08-25-23 @ 16:40)  Source: .Blood Blood-Peripheral  Preliminary Report (08-27-23 @ 22:03):    No growth at 48 Hours        Radiology: reviewed     Medications:  chlorhexidine 2% Cloths 1 Application(s) Topical daily  piperacillin/tazobactam IVPB.. 3.375 Gram(s) IV Intermittent every 8 hours    Antimicrobials:  piperacillin/tazobactam IVPB.. 3.375 Gram(s) IV Intermittent every 8 hours

## 2023-08-28 NOTE — PROGRESS NOTE ADULT - SUBJECTIVE AND OBJECTIVE BOX
Patient is a 68y old  Male who presents with a chief complaint of fever, night sweats, epigastric abd pain (28 Aug 2023 09:38)      SUBJECTIVE / OVERNIGHT EVENTS:  Patient seen and examined.   Low grade fever overnight.       Vital Signs Last 24 Hrs  T(C): 37.5 (28 Aug 2023 06:54), Max: 38.1 (27 Aug 2023 15:08)  T(F): 99.5 (28 Aug 2023 06:54), Max: 100.5 (27 Aug 2023 15:08)  HR: 90 (28 Aug 2023 06:54) (90 - 95)  BP: 117/78 (28 Aug 2023 06:54) (117/78 - 124/73)  BP(mean): --  RR: 18 (28 Aug 2023 06:54) (18 - 18)  SpO2: 95% (28 Aug 2023 06:54) (95% - 95%)    Parameters below as of 28 Aug 2023 06:54  Patient On (Oxygen Delivery Method): room air      I&O's Summary    27 Aug 2023 07:01  -  28 Aug 2023 07:00  --------------------------------------------------------  IN: 100 mL / OUT: 2 mL / NET: 98 mL        PHYSICAL EXAM:  GENERAL: NAD, AAOx3  HEAD:  Atraumatic, Normocephalic  EYES: EOMI; conjunctiva and sclera clear  NECK: Supple, No JVD, No LAD  CHEST/LUNG: B/L air entry; No wheezes, rales or rhonci   HEART: Regular rate and rhythm; No murmurs, rubs, or gallops  ABDOMEN: Soft, Nontender, Nondistended; Bowel sounds present  EXTREMITIES:  2+ Peripheral Pulses, No clubbing, cyanosis, or edema  SKIN: No rashes or lesions    LABS:                        11.7   14.58 )-----------( 496      ( 28 Aug 2023 07:03 )             35.2     08-27    136  |  97  |  10  ----------------------------<  166<H>  3.8   |  26  |  1.28    Ca    9.2      27 Aug 2023 08:06  Phos  3.1     08-27  Mg     2.4     08-27    TPro  7.6  /  Alb  3.6  /  TBili  0.7  /  DBili  x   /  AST  69<H>  /  ALT  112<H>  /  AlkPhos  362<H>  08-27      CAPILLARY BLOOD GLUCOSE            Urinalysis Basic - ( 27 Aug 2023 08:06 )    Color: x / Appearance: x / SG: x / pH: x  Gluc: 166 mg/dL / Ketone: x  / Bili: x / Urobili: x   Blood: x / Protein: x / Nitrite: x   Leuk Esterase: x / RBC: x / WBC x   Sq Epi: x / Non Sq Epi: x / Bacteria: x        RADIOLOGY & ADDITIONAL TESTS:    Imaging Personally Reviewed:  [x] YES  [ ] NO    Consultant(s) Notes Reviewed:  [x] YES  [ ] NO      MEDICATIONS  (STANDING):  chlorhexidine 2% Cloths 1 Application(s) Topical daily  piperacillin/tazobactam IVPB.. 3.375 Gram(s) IV Intermittent every 8 hours    MEDICATIONS  (PRN):      Care Discussed with Consultants/Other Providers [x] YES  [ ] NO    HEALTH ISSUES - PROBLEM Dx:  Liver abscess    Sepsis    Transaminitis    Thyroid nodule    Renal lesion

## 2023-08-28 NOTE — PROGRESS NOTE ADULT - ASSESSMENT
68M c no signif PMHx, pw sepsis 2/2 likely liver abscesses of unclear etiol    Sepsis  Liver abscess.   ·  Plan: - complex cysts suspicious for liver abscesses  - unclear etiol  - will check hiv, quant gold, entambeba labs, blood cultures  - CEA and CA 19-9 wnl-- less likely malignant   - ID and GI  following   - IR consult placed --- asa on hold; plan for drainage on 08/29 tuesday.   - cont empiric zosyn for now.    Transaminitis.   ·  Plan: - likely 2/2 liver lesions as above  - hold statin for now.    Thyroid nodule.   -complex isthmus nodule on ultrasound  - f/u pmd and endo as outpt- for  possible FNA , d/w patient.    Renal lesion.   - renal ult with cyst - benign   - f/u pmd and renal as outpt.    dvt ppx: lovenox   gi ppx: dajuan Christianson MD   [facility]  [phone]

## 2023-08-29 LAB
HCT VFR BLD CALC: 36.3 % — LOW (ref 39–50)
HGB BLD-MCNC: 11.6 G/DL — LOW (ref 13–17)
MCHC RBC-ENTMCNC: 27.8 PG — SIGNIFICANT CHANGE UP (ref 27–34)
MCHC RBC-ENTMCNC: 32 GM/DL — SIGNIFICANT CHANGE UP (ref 32–36)
MCV RBC AUTO: 87.1 FL — SIGNIFICANT CHANGE UP (ref 80–100)
NRBC # BLD: 0 /100 WBCS — SIGNIFICANT CHANGE UP (ref 0–0)
PLATELET # BLD AUTO: 529 K/UL — HIGH (ref 150–400)
RBC # BLD: 4.17 M/UL — LOW (ref 4.2–5.8)
RBC # FLD: 13.2 % — SIGNIFICANT CHANGE UP (ref 10.3–14.5)
WBC # BLD: 14.93 K/UL — HIGH (ref 3.8–10.5)
WBC # FLD AUTO: 14.93 K/UL — HIGH (ref 3.8–10.5)

## 2023-08-29 PROCEDURE — 49405 IMAGE CATH FLUID COLXN VISC: CPT

## 2023-08-29 RX ADMIN — PIPERACILLIN AND TAZOBACTAM 25 GRAM(S): 4; .5 INJECTION, POWDER, LYOPHILIZED, FOR SOLUTION INTRAVENOUS at 05:20

## 2023-08-29 RX ADMIN — PIPERACILLIN AND TAZOBACTAM 25 GRAM(S): 4; .5 INJECTION, POWDER, LYOPHILIZED, FOR SOLUTION INTRAVENOUS at 23:05

## 2023-08-29 NOTE — PROGRESS NOTE ADULT - ASSESSMENT
68M c no signif PMHx, pw sepsis 2/2 likely liver abscesses of unclear etiol    Sepsis  Liver abscess.   ·  Plan: - complex cysts suspicious for liver abscesses  - unclear etiol  - f/u  hiv, quant gold, entambeba labs, blood cultures  - CEA and CA 19-9 wnl-- less likely malignant   - ID and GI  following   - IR consult placed --- asa on hold; plan for drainage today.   - cont empiric zosyn for now.    Transaminitis.   ·  Plan: - likely 2/2 liver lesions as above  - hold statin for now.    Thyroid nodule.   -complex isthmus nodule on ultrasound  - f/u pmd and endo as outpt- for  possible FNA , d/w patient.    Renal lesion.   - renal ult with cyst - benign   - f/u pmd and renal as outpt.    dvt ppx: lovenox   gi ppx: dajuan Christianson MD   [facility]  [phone]

## 2023-08-29 NOTE — PROGRESS NOTE ADULT - ASSESSMENT
67 y/o M w/ no significant PMhs who presented w/ epigastric abd pain x 2 weeks w/ associated fevers, night sweats, poor appetite.    hepatic abscesses, fever  leukocytosis, elevated liver enzymes  CT abd- complex hepatic cystic lesion measuring 5.8 cm with closer sign, suggestive of an abscess. additional cystic lesions in the left hepatic lobe measuring up to 3.2 cm likely additional abscesses.  blood cultures- NGTD  s/p IR drain placement 8/29  Recommendations  c/w zosyn  f/u IR cultures    Dinh Stevens M.D.  [facility], Division of Infectious Diseases  [phone]  After 5pm on weekdays and all day on weekends - please call [phone]  67 y/o M w/ no significant PMhs who presented w/ epigastric abd pain x 2 weeks w/ associated fevers, night sweats, poor appetite.    hepatic abscesses, fever  leukocytosis, elevated liver enzymes  CT abd- complex hepatic cystic lesion measuring 5.8 cm with closer sign, suggestive of an abscess. additional cystic lesions in the left hepatic lobe measuring up to 3.2 cm likely additional abscesses.  blood cultures- NGTD  s/p IR drain placement 8/29  Recommendations  c/w zosyn  f/u IR cultures    discussed w/ IR and medicine NP  Dinh Stevens M.D.  [facility], Division of Infectious Diseases  [phone]  After 5pm on weekdays and all day on weekends - please call [phone]  69 y/o M w/ no significant PMhs who presented w/ epigastric abd pain x 2 weeks w/ associated fevers, night sweats, poor appetite.    hepatic abscesses, fever  leukocytosis, elevated liver enzymes  CT abd- complex hepatic cystic lesion measuring 5.8 cm with closer sign, suggestive of an abscess. additional cystic lesions in the left hepatic lobe measuring up to 3.2 cm likely additional abscesses.  blood cultures- NGTD  s/p IR drain placement 8/29  Recommendations  c/w zosyn  ensure cultures from drainage were sent    discussed w/ IR and medicine NP  Dinh Stevens M.D.  [facility], Division of Infectious Diseases  [phone]  After 5pm on weekdays and all day on weekends - please call [phone]

## 2023-08-29 NOTE — PROGRESS NOTE ADULT - SUBJECTIVE AND OBJECTIVE BOX
Chief Complaint:  Patient is a 68y old  Male who presents with a chief complaint of fever, night sweats, epigastric abd pain (29 Aug 2023 14:00)      Date of service 08-29-23 @ 16:00      Interval Events:   s/p IR drain today    Hospital Medications:  chlorhexidine 2% Cloths 1 Application(s) Topical daily  piperacillin/tazobactam IVPB.. 3.375 Gram(s) IV Intermittent every 8 hours        Review of Systems:  General:  No wt loss, fevers, chills, night sweats, fatigue,   Eyes:  Good vision, no reported pain  ENT:  No sore throat, pain, runny nose, dysphagia  CV:  No pain, palpitations, hypo/hypertension  Resp:  No dyspnea, cough, tachypnea, wheezing  GI:  See HPI  :  No pain, bleeding, incontinence, nocturia  Muscle:  No pain, weakness  Neuro:  No weakness, tingling, memory problems  Psych:  No fatigue, insomnia, mood problems, depression  Endocrine:  No polyuria, polydipsia, cold/heat intolerance  Heme:  No petechiae, ecchymosis, easy bruisability  Integumentary:  No rash, edema    PHYSICAL EXAM:   Vital Signs:  Vital Signs Last 24 Hrs  T(C): 38.2 (29 Aug 2023 15:13), Max: 38.2 (29 Aug 2023 15:13)  T(F): 100.7 (29 Aug 2023 15:13), Max: 100.7 (29 Aug 2023 15:13)  HR: 99 (29 Aug 2023 15:13) (85 - 99)  BP: 124/80 (29 Aug 2023 15:13) (98/64 - 124/80)  BP(mean): 86 (29 Aug 2023 14:30) (82 - 89)  RR: 18 (29 Aug 2023 15:13) (15 - 20)  SpO2: 97% (29 Aug 2023 15:13) (92% - 99%)    Parameters below as of 29 Aug 2023 15:13  Patient On (Oxygen Delivery Method): room air      Daily Height in cm: 172.72 (29 Aug 2023 12:35)    Daily       PHYSICAL EXAM:     GENERAL:  Appears stated age, well-groomed, well-nourished, no distress  HEENT:  NC/AT,  conjunctivae anicteric, clear and pink,   NECK: supple, trachea midline  CHEST:  Full & symmetric excursion, no increased effort, breath sounds clear  HEART:  Regular rhythm, no JVD  ABDOMEN:  Soft, non-tender, non-distended, normoactive bowel sounds,  no masses , no hepatosplenomegaly  EXTREMITIES:  no cyanosis,clubbing or edema  SKIN:  No rash, erythema, or, ecchymoses, no jaundice  NEURO:  Alert, non-focal, no asterixis  PSYCH: Appropriate affect, oriented to place and time  RECTAL: Deferred      LABS Personally reviewed by me:                        11.6   14.93 )-----------( 529      ( 29 Aug 2023 07:29 )             36.3     Mean Cell Volume: 87.1 fl (08-29-23 @ 07:29)                                        11.6   14.93 )-----------( 529      ( 29 Aug 2023 07:29 )             36.3                         11.7   14.58 )-----------( 496      ( 28 Aug 2023 07:03 )             35.2                         10.9   15.88 )-----------( 540      ( 27 Aug 2023 08:06 )             33.3                         11.4   15.10 )-----------( 464      ( 27 Aug 2023 07:09 )             34.4       Imaging personally reviewed by me:

## 2023-08-29 NOTE — PRE-ANESTHESIA EVALUATION ADULT - NSANTHOSAYNRD_GEN_A_CORE
No. SINA screening performed.  STOP BANG Legend: 0-2 = LOW Risk; 3-4 = INTERMEDIATE Risk; 5-8 = HIGH Risk

## 2023-08-29 NOTE — PROGRESS NOTE ADULT - ASSESSMENT
1. Liver abscess. Unclear precipitating factor. No obvious biliary pathology on CT. s/p IR drain today.   - abx per ID   - recc outpatient colonoscopy in 6-8 weeks     2. Colonic diverticulosis. No e/o diverticulitis.    3. Leukocytosis

## 2023-08-29 NOTE — PROGRESS NOTE ADULT - SUBJECTIVE AND OBJECTIVE BOX
[facility], Division of Infectious Diseases  DIAMOND Moore Y. Patel, S. Shah, G. Rodney  [phone]  ([phone] - weekdays after 5pm and weekends)    Name: JADA EDWARDS  Age/Gender: 68y Male  MRN: [id number]    Interval History:  Notes reviewed.   No concerning overnight events.  Afebrile. Tmax 100.3  has IR drain placed today  currently he has no abd pain    Allergies: No Known Allergies      Objective:  Vitals:   T(F): 98.3 (08-29-23 @ 11:03), Max: 100.3 (08-28-23 @ 17:45)  HR: 89 (08-29-23 @ 12:35) (89 - 96)  BP: 113/72 (08-29-23 @ 12:35) (98/64 - 117/68)  RR: 18 (08-29-23 @ 12:35) (16 - 18)  SpO2: 97% (08-29-23 @ 12:35) (92% - 98%)  Physical Examination:  General: no acute distress  HEENT: anicteric  Cardio: S1, S2, normal rate  Resp: clear to auscultation bilaterally  Abd: soft, NT, ND, abd drain  Ext: no LE edema  Skin: warm, dry    Laboratory Studies:  CBC:                       11.6   14.93 )-----------( 529      ( 29 Aug 2023 07:29 )             36.3     WBC Trend:  14.93 08-29-23 @ 07:29  14.58 08-28-23 @ 07:03  15.88 08-27-23 @ 08:06  15.10 08-27-23 @ 07:09  15.82 08-25-23 @ 17:08    CMP:               Microbiology: reviewed     Culture - Urine (collected 08-25-23 @ 19:54)  Source: Clean Catch Clean Catch (Midstream)  Final Report (08-26-23 @ 21:25):    <10,000 CFU/mL Normal Urogenital Sunita    Culture - Blood (collected 08-25-23 @ 16:55)  Source: .Blood Blood-Peripheral  Preliminary Report (08-28-23 @ 22:02):    No growth at 72 Hours    Culture - Blood (collected 08-25-23 @ 16:40)  Source: .Blood Blood-Peripheral  Preliminary Report (08-28-23 @ 22:02):    No growth at 72 Hours        Radiology: reviewed     Medications:  chlorhexidine 2% Cloths 1 Application(s) Topical daily  piperacillin/tazobactam IVPB.. 3.375 Gram(s) IV Intermittent every 8 hours    Antimicrobials:  piperacillin/tazobactam IVPB.. 3.375 Gram(s) IV Intermittent every 8 hours

## 2023-08-29 NOTE — PROGRESS NOTE ADULT - SUBJECTIVE AND OBJECTIVE BOX
Patient is a 68y old  Male who presents with a chief complaint of fever, night sweats, epigastric abd pain (28 Aug 2023 14:44)      SUBJECTIVE / OVERNIGHT EVENTS:  Patient seen and examined.   no complaints.       Vital Signs Last 24 Hrs  T(C): 36.8 (29 Aug 2023 12:35), Max: 37.9 (28 Aug 2023 17:45)  T(F): 98.3 (29 Aug 2023 11:03), Max: 100.3 (28 Aug 2023 17:45)  HR: 89 (29 Aug 2023 12:35) (89 - 96)  BP: 113/72 (29 Aug 2023 12:35) (98/64 - 117/68)  BP(mean): --  RR: 18 (29 Aug 2023 12:35) (16 - 18)  SpO2: 97% (29 Aug 2023 12:35) (92% - 98%)    Parameters below as of 29 Aug 2023 11:03  Patient On (Oxygen Delivery Method): room air      I&O's Summary      PHYSICAL EXAM:  GENERAL: NAD, AAOx3  HEAD:  Atraumatic, Normocephalic  EYES: EOMI; conjunctiva and sclera clear  NECK: Supple, No JVD, No LAD  CHEST/LUNG: B/L air entry; No wheezes, rales or rhonci   HEART: Regular rate and rhythm; No murmurs, rubs, or gallops  ABDOMEN: Soft, Nontender, Nondistended; Bowel sounds present  EXTREMITIES:  2+ Peripheral Pulses, No clubbing, cyanosis, or edema  SKIN: No rashes or lesions    LABS:                        11.6   14.93 )-----------( 529      ( 29 Aug 2023 07:29 )             36.3             CAPILLARY BLOOD GLUCOSE                RADIOLOGY & ADDITIONAL TESTS:    Imaging Personally Reviewed:  [x] YES  [ ] NO    Consultant(s) Notes Reviewed:  [x] YES  [ ] NO      MEDICATIONS  (STANDING):  chlorhexidine 2% Cloths 1 Application(s) Topical daily  piperacillin/tazobactam IVPB.. 3.375 Gram(s) IV Intermittent every 8 hours    MEDICATIONS  (PRN):      Care Discussed with Consultants/Other Providers [x] YES  [ ] NO    HEALTH ISSUES - PROBLEM Dx:  Liver abscess    Sepsis    Transaminitis    Thyroid nodule    Renal lesion

## 2023-08-30 LAB
ALBUMIN SERPL ELPH-MCNC: 3.3 G/DL — SIGNIFICANT CHANGE UP (ref 3.3–5)
ALP SERPL-CCNC: 411 U/L — HIGH (ref 40–120)
ALT FLD-CCNC: 177 U/L — HIGH (ref 10–45)
ANION GAP SERPL CALC-SCNC: 14 MMOL/L — SIGNIFICANT CHANGE UP (ref 5–17)
AST SERPL-CCNC: 91 U/L — HIGH (ref 10–40)
BASOPHILS # BLD AUTO: 0.11 K/UL — SIGNIFICANT CHANGE UP (ref 0–0.2)
BASOPHILS NFR BLD AUTO: 0.9 % — SIGNIFICANT CHANGE UP (ref 0–2)
BILIRUB SERPL-MCNC: 0.5 MG/DL — SIGNIFICANT CHANGE UP (ref 0.2–1.2)
BUN SERPL-MCNC: 12 MG/DL — SIGNIFICANT CHANGE UP (ref 7–23)
CALCIUM SERPL-MCNC: 9.3 MG/DL — SIGNIFICANT CHANGE UP (ref 8.4–10.5)
CHLORIDE SERPL-SCNC: 95 MMOL/L — LOW (ref 96–108)
CO2 SERPL-SCNC: 25 MMOL/L — SIGNIFICANT CHANGE UP (ref 22–31)
CREAT SERPL-MCNC: 1.39 MG/DL — HIGH (ref 0.5–1.3)
CULTURE RESULTS: SIGNIFICANT CHANGE UP
CULTURE RESULTS: SIGNIFICANT CHANGE UP
E HISTOLYT AG STL IA-ACNC: SIGNIFICANT CHANGE UP
EGFR: 56 ML/MIN/1.73M2 — LOW
EOSINOPHIL # BLD AUTO: 0.11 K/UL — SIGNIFICANT CHANGE UP (ref 0–0.5)
EOSINOPHIL NFR BLD AUTO: 0.9 % — SIGNIFICANT CHANGE UP (ref 0–6)
GAMMA INTERFERON BACKGROUND BLD IA-ACNC: 0.01 IU/ML — SIGNIFICANT CHANGE UP
GLUCOSE SERPL-MCNC: 148 MG/DL — HIGH (ref 70–99)
GRAM STN FLD: SIGNIFICANT CHANGE UP
HCT VFR BLD CALC: 36.8 % — LOW (ref 39–50)
HGB BLD-MCNC: 12 G/DL — LOW (ref 13–17)
LYMPHOCYTES # BLD AUTO: 0.44 K/UL — LOW (ref 1–3.3)
LYMPHOCYTES # BLD AUTO: 3.5 % — LOW (ref 13–44)
M TB IFN-G BLD-IMP: NEGATIVE — SIGNIFICANT CHANGE UP
M TB IFN-G CD4+ BCKGRND COR BLD-ACNC: 0 IU/ML — SIGNIFICANT CHANGE UP
M TB IFN-G CD4+CD8+ BCKGRND COR BLD-ACNC: 0 IU/ML — SIGNIFICANT CHANGE UP
MANUAL SMEAR VERIFICATION: SIGNIFICANT CHANGE UP
MCHC RBC-ENTMCNC: 28.2 PG — SIGNIFICANT CHANGE UP (ref 27–34)
MCHC RBC-ENTMCNC: 32.6 GM/DL — SIGNIFICANT CHANGE UP (ref 32–36)
MCV RBC AUTO: 86.4 FL — SIGNIFICANT CHANGE UP (ref 80–100)
MONOCYTES # BLD AUTO: 1.35 K/UL — HIGH (ref 0–0.9)
MONOCYTES NFR BLD AUTO: 10.6 % — SIGNIFICANT CHANGE UP (ref 2–14)
MYELOCYTES NFR BLD: 1.8 % — HIGH (ref 0–0)
NEUTROPHILS # BLD AUTO: 10.22 K/UL — HIGH (ref 1.8–7.4)
NEUTROPHILS NFR BLD AUTO: 80.5 % — HIGH (ref 43–77)
PLAT MORPH BLD: ABNORMAL
PLATELET # BLD AUTO: 533 K/UL — HIGH (ref 150–400)
POTASSIUM SERPL-MCNC: 4.5 MMOL/L — SIGNIFICANT CHANGE UP (ref 3.5–5.3)
POTASSIUM SERPL-SCNC: 4.5 MMOL/L — SIGNIFICANT CHANGE UP (ref 3.5–5.3)
PROT SERPL-MCNC: 7.8 G/DL — SIGNIFICANT CHANGE UP (ref 6–8.3)
QUANT TB PLUS MITOGEN MINUS NIL: 0.79 IU/ML — SIGNIFICANT CHANGE UP
RBC # BLD: 4.26 M/UL — SIGNIFICANT CHANGE UP (ref 4.2–5.8)
RBC # FLD: 13.1 % — SIGNIFICANT CHANGE UP (ref 10.3–14.5)
RBC BLD AUTO: SIGNIFICANT CHANGE UP
SODIUM SERPL-SCNC: 134 MMOL/L — LOW (ref 135–145)
SPECIMEN SOURCE: SIGNIFICANT CHANGE UP
VARIANT LYMPHS # BLD: 1.8 % — SIGNIFICANT CHANGE UP (ref 0–6)
WBC # BLD: 12.7 K/UL — HIGH (ref 3.8–10.5)
WBC # FLD AUTO: 12.7 K/UL — HIGH (ref 3.8–10.5)

## 2023-08-30 RX ORDER — PIPERACILLIN AND TAZOBACTAM 4; .5 G/20ML; G/20ML
3.38 INJECTION, POWDER, LYOPHILIZED, FOR SOLUTION INTRAVENOUS EVERY 8 HOURS
Refills: 0 | Status: DISCONTINUED | OUTPATIENT
Start: 2023-08-30 | End: 2023-09-01

## 2023-08-30 RX ADMIN — PIPERACILLIN AND TAZOBACTAM 25 GRAM(S): 4; .5 INJECTION, POWDER, LYOPHILIZED, FOR SOLUTION INTRAVENOUS at 13:29

## 2023-08-30 RX ADMIN — PIPERACILLIN AND TAZOBACTAM 25 GRAM(S): 4; .5 INJECTION, POWDER, LYOPHILIZED, FOR SOLUTION INTRAVENOUS at 21:09

## 2023-08-30 RX ADMIN — PIPERACILLIN AND TAZOBACTAM 25 GRAM(S): 4; .5 INJECTION, POWDER, LYOPHILIZED, FOR SOLUTION INTRAVENOUS at 05:31

## 2023-08-30 RX ADMIN — CHLORHEXIDINE GLUCONATE 1 APPLICATION(S): 213 SOLUTION TOPICAL at 13:34

## 2023-08-30 NOTE — PROGRESS NOTE ADULT - SUBJECTIVE AND OBJECTIVE BOX
Chief Complaint:  Patient is a 68y old  Male who presents with a chief complaint of fever, night sweats, epigastric abd pain (30 Aug 2023 11:53)      Date of service 23 @ 15:09      Interval Events:   no acute events     Hospital Medications:  chlorhexidine 2% Cloths 1 Application(s) Topical daily  piperacillin/tazobactam IVPB.. 3.375 Gram(s) IV Intermittent every 8 hours        Review of Systems:  General:  No wt loss, fevers, chills, night sweats, fatigue,   Eyes:  Good vision, no reported pain  ENT:  No sore throat, pain, runny nose, dysphagia  CV:  No pain, palpitations, hypo/hypertension  Resp:  No dyspnea, cough, tachypnea, wheezing  GI:  See HPI  :  No pain, bleeding, incontinence, nocturia  Muscle:  No pain, weakness  Neuro:  No weakness, tingling, memory problems  Psych:  No fatigue, insomnia, mood problems, depression  Endocrine:  No polyuria, polydipsia, cold/heat intolerance  Heme:  No petechiae, ecchymosis, easy bruisability  Integumentary:  No rash, edema    PHYSICAL EXAM:   Vital Signs:  Vital Signs Last 24 Hrs  T(C): 36.8 (30 Aug 2023 10:00), Max: 38.2 (29 Aug 2023 15:13)  T(F): 98.2 (30 Aug 2023 10:00), Max: 100.7 (29 Aug 2023 15:13)  HR: 91 (30 Aug 2023 10:00) (87 - 99)  BP: 108/70 (30 Aug 2023 10:00) (108/70 - 124/80)  BP(mean): --  RR: 18 (30 Aug 2023 10:00) (18 - 18)  SpO2: 95% (30 Aug 2023 10:00) (95% - 97%)    Parameters below as of 30 Aug 2023 10:00  Patient On (Oxygen Delivery Method): room air      Daily     Daily Weight in k.4 (30 Aug 2023 08:06)      PHYSICAL EXAM:     GENERAL:  Appears stated age, well-groomed, well-nourished, no distress  HEENT:  NC/AT,  conjunctivae anicteric, clear and pink,   NECK: supple, trachea midline  CHEST:  Full & symmetric excursion, no increased effort, breath sounds clear  HEART:  Regular rhythm, no JVD  ABDOMEN:  Soft, non-tender, non-distended, normoactive bowel sounds,  no masses , no hepatosplenomegaly  EXTREMITIES:  no cyanosis,clubbing or edema  SKIN:  No rash, erythema, or, ecchymoses, no jaundice  NEURO:  Alert, non-focal, no asterixis  PSYCH: Appropriate affect, oriented to place and time  RECTAL: Deferred      LABS Personally reviewed by me:                        12.0   12 )-----------( 533      ( 30 Aug 2023 05:18 )             36.8     Mean Cell Volume: 86.4 fl (- @ 05:18)        134<L>  |  95<L>  |  12  ----------------------------<  148<H>  4.5   |  25  |  1.39<H>    Ca    9.3      30 Aug 2023 05:18    TPro  7.8  /  Alb  3.3  /  TBili  0.5  /  DBili  x   /  AST  91<H>  /  ALT  177<H>  /  AlkPhos  411<H>      LIVER FUNCTIONS - ( 30 Aug 2023 05:18 )  Alb: 3.3 g/dL / Pro: 7.8 g/dL / ALK PHOS: 411 U/L / ALT: 177 U/L / AST: 91 U/L / GGT: x             Urinalysis Basic - ( 30 Aug 2023 05:18 )    Color: x / Appearance: x / SG: x / pH: x  Gluc: 148 mg/dL / Ketone: x  / Bili: x / Urobili: x   Blood: x / Protein: x / Nitrite: x   Leuk Esterase: x / RBC: x / WBC x   Sq Epi: x / Non Sq Epi: x / Bacteria: x                              12.0   12 )-----------( 533      ( 30 Aug 2023 05:18 )             36.8                         11.6   14.93 )-----------( 529      ( 29 Aug 2023 07:29 )             36.3                         11.7   14.58 )-----------( 496      ( 28 Aug 2023 07:03 )             35.2       Imaging personally reviewed by me:             Chief Complaint:  Patient is a 68y old  Male who presents with a chief complaint of fever, night sweats, epigastric abd pain (30 Aug 2023 11:53)      Date of service 23 @ 15:09      Interval Events:   had diarrhea the past few days, none today     Hospital Medications:  chlorhexidine 2% Cloths 1 Application(s) Topical daily  piperacillin/tazobactam IVPB.. 3.375 Gram(s) IV Intermittent every 8 hours        Review of Systems:  General:  No wt loss, fevers, chills, night sweats, fatigue,   Eyes:  Good vision, no reported pain  ENT:  No sore throat, pain, runny nose, dysphagia  CV:  No pain, palpitations, hypo/hypertension  Resp:  No dyspnea, cough, tachypnea, wheezing  GI:  See HPI  :  No pain, bleeding, incontinence, nocturia  Muscle:  No pain, weakness  Neuro:  No weakness, tingling, memory problems  Psych:  No fatigue, insomnia, mood problems, depression  Endocrine:  No polyuria, polydipsia, cold/heat intolerance  Heme:  No petechiae, ecchymosis, easy bruisability  Integumentary:  No rash, edema    PHYSICAL EXAM:   Vital Signs:  Vital Signs Last 24 Hrs  T(C): 36.8 (30 Aug 2023 10:00), Max: 38.2 (29 Aug 2023 15:13)  T(F): 98.2 (30 Aug 2023 10:00), Max: 100.7 (29 Aug 2023 15:13)  HR: 91 (30 Aug 2023 10:00) (87 - 99)  BP: 108/70 (30 Aug 2023 10:00) (108/70 - 124/80)  BP(mean): --  RR: 18 (30 Aug 2023 10:00) (18 - 18)  SpO2: 95% (30 Aug 2023 10:00) (95% - 97%)    Parameters below as of 30 Aug 2023 10:00  Patient On (Oxygen Delivery Method): room air      Daily     Daily Weight in k.4 (30 Aug 2023 08:06)      PHYSICAL EXAM:     GENERAL:  Appears stated age, well-groomed, well-nourished, no distress  HEENT:  NC/AT,  conjunctivae anicteric, clear and pink,   NECK: supple, trachea midline  CHEST:  Full & symmetric excursion, no increased effort, breath sounds clear  HEART:  Regular rhythm, no JVD  ABDOMEN:  Soft, non-tender, non-distended, normoactive bowel sounds,  no masses , no hepatosplenomegaly  EXTREMITIES:  no cyanosis,clubbing or edema  SKIN:  No rash, erythema, or, ecchymoses, no jaundice  NEURO:  Alert, non-focal, no asterixis  PSYCH: Appropriate affect, oriented to place and time  RECTAL: Deferred      LABS Personally reviewed by me:                        12.0   12 )-----------( 533      ( 30 Aug 2023 05:18 )             36.8     Mean Cell Volume: 86.4 fl (-23 @ 05:18)        134<L>  |  95<L>  |  12  ----------------------------<  148<H>  4.5   |  25  |  1.39<H>    Ca    9.3      30 Aug 2023 05:18    TPro  7.8  /  Alb  3.3  /  TBili  0.5  /  DBili  x   /  AST  91<H>  /  ALT  177<H>  /  AlkPhos  411<H>      LIVER FUNCTIONS - ( 30 Aug 2023 05:18 )  Alb: 3.3 g/dL / Pro: 7.8 g/dL / ALK PHOS: 411 U/L / ALT: 177 U/L / AST: 91 U/L / GGT: x             Urinalysis Basic - ( 30 Aug 2023 05:18 )    Color: x / Appearance: x / SG: x / pH: x  Gluc: 148 mg/dL / Ketone: x  / Bili: x / Urobili: x   Blood: x / Protein: x / Nitrite: x   Leuk Esterase: x / RBC: x / WBC x   Sq Epi: x / Non Sq Epi: x / Bacteria: x                              12.0   1270 )-----------( 533      ( 30 Aug 2023 05:18 )             36.8                         11.6   14.93 )-----------( 529      ( 29 Aug 2023 07:29 )             36.3                         11.7   14.58 )-----------( 496      ( 28 Aug 2023 07:03 )             35.2       Imaging personally reviewed by me:

## 2023-08-30 NOTE — PROGRESS NOTE ADULT - SUBJECTIVE AND OBJECTIVE BOX
Patient is a 68y old  Male who presents with a chief complaint of fever, night sweats, epigastric abd pain (29 Aug 2023 15:59)      SUBJECTIVE / OVERNIGHT EVENTS:    Patient seen and examined. no compalints. no abd pain nvd. feel gen weakness.      Vital Signs Last 24 Hrs  T(C): 36.8 (30 Aug 2023 10:00), Max: 38.2 (29 Aug 2023 15:13)  T(F): 98.2 (30 Aug 2023 10:00), Max: 100.7 (29 Aug 2023 15:13)  HR: 91 (30 Aug 2023 10:00) (85 - 99)  BP: 108/70 (30 Aug 2023 10:00) (108/69 - 124/80)  BP(mean): 86 (29 Aug 2023 14:30) (82 - 89)  RR: 18 (30 Aug 2023 10:00) (15 - 20)  SpO2: 95% (30 Aug 2023 10:00) (95% - 99%)    Parameters below as of 30 Aug 2023 10:00  Patient On (Oxygen Delivery Method): room air      I&O's Summary    29 Aug 2023 07:01  -  30 Aug 2023 07:00  --------------------------------------------------------  IN: 0 mL / OUT: 0 mL / NET: 0 mL        PE:  GENERAL: NAD, AAOx3, fatigued  CHEST/LUNG: CTABL, No wheeze  HEART: Regular rate and rhythm; no murmur  ABDOMEN: Soft, Nontender, distended; Bowel sounds present  EXTREMITIES:  2+ Peripheral Pulses, No edema  NEURO: No focal deficits    LABS:                        12.0   12.70 )-----------( 533      ( 30 Aug 2023 05:18 )             36.8     08-30    134<L>  |  95<L>  |  12  ----------------------------<  148<H>  4.5   |  25  |  1.39<H>    Ca    9.3      30 Aug 2023 05:18    TPro  7.8  /  Alb  3.3  /  TBili  0.5  /  DBili  x   /  AST  91<H>  /  ALT  177<H>  /  AlkPhos  411<H>  08-30      CAPILLARY BLOOD GLUCOSE            Urinalysis Basic - ( 30 Aug 2023 05:18 )    Color: x / Appearance: x / SG: x / pH: x  Gluc: 148 mg/dL / Ketone: x  / Bili: x / Urobili: x   Blood: x / Protein: x / Nitrite: x   Leuk Esterase: x / RBC: x / WBC x   Sq Epi: x / Non Sq Epi: x / Bacteria: x        RADIOLOGY & ADDITIONAL TESTS:    Imaging Personally Reviewed:  [x] YES  [ ] NO    Consultant(s) Notes Reviewed:  [x] YES  [ ] NO    MEDICATIONS  (STANDING):  chlorhexidine 2% Cloths 1 Application(s) Topical daily  piperacillin/tazobactam IVPB.. 3.375 Gram(s) IV Intermittent every 8 hours    MEDICATIONS  (PRN):      Care Discussed with Consultants/Other Providers [x] YES  [ ] NO    HEALTH ISSUES - PROBLEM Dx:  Liver abscess    Sepsis    Transaminitis    Thyroid nodule    Renal lesion         Patient is a 68y old  Male who presents with a chief complaint of fever, night sweats, epigastric abd pain (29 Aug 2023 15:59)      SUBJECTIVE / OVERNIGHT EVENTS:    Patient seen and examined. no compalints. no abd pain nvd. feel gen weakness.      Vital Signs Last 24 Hrs  T(C): 36.8 (30 Aug 2023 10:00), Max: 38.2 (29 Aug 2023 15:13)  T(F): 98.2 (30 Aug 2023 10:00), Max: 100.7 (29 Aug 2023 15:13)  HR: 91 (30 Aug 2023 10:00) (85 - 99)  BP: 108/70 (30 Aug 2023 10:00) (108/69 - 124/80)  BP(mean): 86 (29 Aug 2023 14:30) (82 - 89)  RR: 18 (30 Aug 2023 10:00) (15 - 20)  SpO2: 95% (30 Aug 2023 10:00) (95% - 99%)    Parameters below as of 30 Aug 2023 10:00  Patient On (Oxygen Delivery Method): room air      I&O's Summary    29 Aug 2023 07:01  -  30 Aug 2023 07:00  --------------------------------------------------------  IN: 0 mL / OUT: 0 mL / NET: 0 mL        PE:  GENERAL: NAD, AAOx3, fatigued  CHEST/LUNG: CTABL, No wheeze  HEART: Regular rate and rhythm; no murmur  ABDOMEN: Soft, Nontender, drain in place, Bowel sounds present  EXTREMITIES:  2+ Peripheral Pulses, No edema  NEURO: No focal deficits    LABS:                        12.0   12.70 )-----------( 533      ( 30 Aug 2023 05:18 )             36.8     08-30    134<L>  |  95<L>  |  12  ----------------------------<  148<H>  4.5   |  25  |  1.39<H>    Ca    9.3      30 Aug 2023 05:18    TPro  7.8  /  Alb  3.3  /  TBili  0.5  /  DBili  x   /  AST  91<H>  /  ALT  177<H>  /  AlkPhos  411<H>  08-30      CAPILLARY BLOOD GLUCOSE            Urinalysis Basic - ( 30 Aug 2023 05:18 )    Color: x / Appearance: x / SG: x / pH: x  Gluc: 148 mg/dL / Ketone: x  / Bili: x / Urobili: x   Blood: x / Protein: x / Nitrite: x   Leuk Esterase: x / RBC: x / WBC x   Sq Epi: x / Non Sq Epi: x / Bacteria: x        RADIOLOGY & ADDITIONAL TESTS:    Imaging Personally Reviewed:  [x] YES  [ ] NO    Consultant(s) Notes Reviewed:  [x] YES  [ ] NO    MEDICATIONS  (STANDING):  chlorhexidine 2% Cloths 1 Application(s) Topical daily  piperacillin/tazobactam IVPB.. 3.375 Gram(s) IV Intermittent every 8 hours    MEDICATIONS  (PRN):      Care Discussed with Consultants/Other Providers [x] YES  [ ] NO    HEALTH ISSUES - PROBLEM Dx:  Liver abscess    Sepsis    Transaminitis    Thyroid nodule    Renal lesion

## 2023-08-30 NOTE — PROGRESS NOTE ADULT - ASSESSMENT
69 y/o M w/ no significant PMhs who presented w/ epigastric abd pain x 2 weeks w/ associated fevers, night sweats, poor appetite.    hepatic abscesses, fever  leukocytosis, elevated liver enzymes  CT abd- complex hepatic cystic lesion measuring 5.8 cm with closer sign, suggestive of an abscess. additional cystic lesions in the left hepatic lobe measuring up to 3.2 cm likely additional abscesses.  blood cultures- NGTD  s/p IR drain placement 8/29    Recommendations  c/w zosyn  f/u IR culture  trend wbc    Dinh Stevens M.D.  [facility], Division of Infectious Diseases  [phone]  After 5pm on weekdays and all day on weekends - please call [phone]

## 2023-08-30 NOTE — PROGRESS NOTE ADULT - SUBJECTIVE AND OBJECTIVE BOX
[facility], Division of Infectious Diseases  DIAMOND Moore Y. Patel, S. Shah, G. Rodney  [phone]  ([phone] - weekdays after 5pm and weekends)    Name: JADA EDWARDS  Age/Gender: 68y Male  MRN: [id number]    Interval History:  Notes reviewed.   No concerning overnight events.  febrile yesterday evening  s/p IR tube placement yesterday  denies abd pain today    Allergies: No Known Allergies      Objective:  Vitals:   T(F): 98.2 (08-30-23 @ 10:00), Max: 100.3 (08-29-23 @ 19:51)  HR: 91 (08-30-23 @ 10:00) (87 - 91)  BP: 108/70 (08-30-23 @ 10:00) (108/70 - 122/68)  RR: 18 (08-30-23 @ 10:00) (18 - 18)  SpO2: 95% (08-30-23 @ 10:00) (95% - 97%)  Physical Examination:  General: no acute distress  HEENT: anicteric  Cardio: S1, S2, normal rate  Resp: clear to auscultation bilaterally  Abd: soft, NT, ND, abd drain w/ small amount of yellow fluid  Ext: no LE edema  Skin: warm, dry    Laboratory Studies:  CBC:                       12.0   12.70 )-----------( 533      ( 30 Aug 2023 05:18 )             36.8     WBC Trend:  12.70 08-30-23 @ 05:18  14.93 08-29-23 @ 07:29  14.58 08-28-23 @ 07:03  15.88 08-27-23 @ 08:06  15.10 08-27-23 @ 07:09  15.82 08-25-23 @ 17:08    CMP: 08-30    134<L>  |  95<L>  |  12  ----------------------------<  148<H>  4.5   |  25  |  1.39<H>    Ca    9.3      30 Aug 2023 05:18    TPro  7.8  /  Alb  3.3  /  TBili  0.5  /  DBili  x   /  AST  91<H>  /  ALT  177<H>  /  AlkPhos  411<H>  08-30      LIVER FUNCTIONS - ( 30 Aug 2023 05:18 )  Alb: 3.3 g/dL / Pro: 7.8 g/dL / ALK PHOS: 411 U/L / ALT: 177 U/L / AST: 91 U/L / GGT: x             Urinalysis Basic - ( 30 Aug 2023 05:18 )    Color: x / Appearance: x / SG: x / pH: x  Gluc: 148 mg/dL / Ketone: x  / Bili: x / Urobili: x   Blood: x / Protein: x / Nitrite: x   Leuk Esterase: x / RBC: x / WBC x   Sq Epi: x / Non Sq Epi: x / Bacteria: x      Microbiology: reviewed     Culture - Body Fluid with Gram Stain (collected 08-29-23 @ 19:42)  Source: .Body Fluid liver abscess fluid  Gram Stain (08-30-23 @ 02:26):    Numerous polymorphonuclear leukocytes seen per low power field    No organisms seen per oil power field    Limited volume of specimen received, Unable to centrifuge/concentrate    specimen. Culture results may be compromised.    Culture - Urine (collected 08-25-23 @ 19:54)  Source: Clean Catch Clean Catch (Midstream)  Final Report (08-26-23 @ 21:25):    <10,000 CFU/mL Normal Urogenital Sunita    Culture - Blood (collected 08-25-23 @ 16:55)  Source: .Blood Blood-Peripheral  Preliminary Report (08-29-23 @ 22:01):    No growth at 4 days    Culture - Blood (collected 08-25-23 @ 16:40)  Source: .Blood Blood-Peripheral  Preliminary Report (08-29-23 @ 22:01):    No growth at 4 days        Radiology: reviewed     Medications:  chlorhexidine 2% Cloths 1 Application(s) Topical daily  piperacillin/tazobactam IVPB.. 3.375 Gram(s) IV Intermittent every 8 hours    Antimicrobials:  piperacillin/tazobactam IVPB.. 3.375 Gram(s) IV Intermittent every 8 hours

## 2023-08-30 NOTE — PROGRESS NOTE ADULT - ASSESSMENT
68M c no signif PMHx, pw sepsis 2/2 likely liver abscesses of unclear etiol.    Sepsis  Liver abscess  - complex cysts suspicious for liver abscesses  - unclear etiol  - CEA and CA 19-9 wnl-- less likely malignant    - IR sp drainage, fu cultures, leukocytosis downtrending  - cont empiric zosyn for now, ID following    Transaminitis  - likely 2/2 liver lesions as above  - hold statin for now    Thyroid nodule  - complex isthmus nodule on ultrasound  - f/u pmd and endo as outpt    Renal lesion  - renal ult with cyst - benign   - f/u pmd and renal as outpt    dvt ppx: lovenox     Please call [facility] with questions [phone].   68M c no signif PMHx, pw sepsis 2/2 likely liver abscesses of unclear etiol.    Sepsis  Liver abscess  - complex cysts suspicious for liver abscesses  - unclear etiol  - CEA and CA 19-9 wnl-- less likely malignant    - IR sp drainage, fu cultures, leukocytosis downtrending  - cont empiric zosyn for now, ID following  - drain care    Transaminitis  - likely 2/2 liver lesions as above  - hold statin for now    Thyroid nodule  - complex isthmus nodule on ultrasound  - f/u pmd and endo as outpt    Renal lesion  - renal ult with cyst - benign   - f/u pmd and renal as outpt    dvt ppx: lovenox     Please call [facility] with questions [phone].

## 2023-08-30 NOTE — PROGRESS NOTE ADULT - ASSESSMENT
1. Liver abscess. Unclear precipitating factor. No obvious biliary pathology on CT. s/p IR drain, Cx unrevealing. Entamoeba serologies pending. WBC slowly downtrending.   - abx per ID   - recc outpatient colonoscopy in 6-8 weeks     2. Colonic diverticulosis. No e/o diverticulitis.    3. Leukocytosis 1. Liver abscess, leukocytosis. Unclear precipitating factor. No obvious biliary pathology on CT. s/p IR drain, Cx unrevealing. Entamoeba serologies pending.   - abx per ID   - recc outpatient colonoscopy in 6-8 weeks (patient follows with GI Dr. Beltran, last colonoscopy 3 yrs ago wnl)    2. Colonic diverticulosis. No e/o diverticulitis.    3. diarrhea. started after abx were initiated. seems to be improving. monitor for now.

## 2023-08-31 ENCOUNTER — TRANSCRIPTION ENCOUNTER (OUTPATIENT)
Age: 68
End: 2023-08-31

## 2023-08-31 LAB
ALBUMIN SERPL ELPH-MCNC: 3.5 G/DL — SIGNIFICANT CHANGE UP (ref 3.3–5)
ALP SERPL-CCNC: 396 U/L — HIGH (ref 40–120)
ALT FLD-CCNC: 159 U/L — HIGH (ref 10–45)
ANION GAP SERPL CALC-SCNC: 11 MMOL/L — SIGNIFICANT CHANGE UP (ref 5–17)
AST SERPL-CCNC: 62 U/L — HIGH (ref 10–40)
BILIRUB SERPL-MCNC: 0.3 MG/DL — SIGNIFICANT CHANGE UP (ref 0.2–1.2)
BUN SERPL-MCNC: 15 MG/DL — SIGNIFICANT CHANGE UP (ref 7–23)
CALCIUM SERPL-MCNC: 9.6 MG/DL — SIGNIFICANT CHANGE UP (ref 8.4–10.5)
CHLORIDE SERPL-SCNC: 98 MMOL/L — SIGNIFICANT CHANGE UP (ref 96–108)
CO2 SERPL-SCNC: 26 MMOL/L — SIGNIFICANT CHANGE UP (ref 22–31)
CREAT SERPL-MCNC: 1.22 MG/DL — SIGNIFICANT CHANGE UP (ref 0.5–1.3)
EGFR: 65 ML/MIN/1.73M2 — SIGNIFICANT CHANGE UP
GLUCOSE SERPL-MCNC: 156 MG/DL — HIGH (ref 70–99)
POTASSIUM SERPL-MCNC: 5 MMOL/L — SIGNIFICANT CHANGE UP (ref 3.5–5.3)
POTASSIUM SERPL-SCNC: 5 MMOL/L — SIGNIFICANT CHANGE UP (ref 3.5–5.3)
PROT SERPL-MCNC: 8 G/DL — SIGNIFICANT CHANGE UP (ref 6–8.3)
SODIUM SERPL-SCNC: 135 MMOL/L — SIGNIFICANT CHANGE UP (ref 135–145)

## 2023-08-31 PROCEDURE — 99231 SBSQ HOSP IP/OBS SF/LOW 25: CPT

## 2023-08-31 RX ADMIN — PIPERACILLIN AND TAZOBACTAM 25 GRAM(S): 4; .5 INJECTION, POWDER, LYOPHILIZED, FOR SOLUTION INTRAVENOUS at 05:09

## 2023-08-31 RX ADMIN — PIPERACILLIN AND TAZOBACTAM 25 GRAM(S): 4; .5 INJECTION, POWDER, LYOPHILIZED, FOR SOLUTION INTRAVENOUS at 21:02

## 2023-08-31 RX ADMIN — PIPERACILLIN AND TAZOBACTAM 25 GRAM(S): 4; .5 INJECTION, POWDER, LYOPHILIZED, FOR SOLUTION INTRAVENOUS at 14:06

## 2023-08-31 RX ADMIN — CHLORHEXIDINE GLUCONATE 1 APPLICATION(S): 213 SOLUTION TOPICAL at 14:12

## 2023-08-31 NOTE — DISCHARGE NOTE PROVIDER - NSDCFUSCHEDAPPT_GEN_ALL_CORE_FT
Doctor, Unknown  [facility]  Scheduled Appointment: 09/07/2023

## 2023-08-31 NOTE — PROCEDURE NOTE - PROCEDURE FINDINGS AND DETAILS
Successful drainage of left hepatic lobe abscess. Approximately 40cc pus aspirated. Drain put to gravity bag drainage.

## 2023-08-31 NOTE — PROGRESS NOTE ADULT - ASSESSMENT
1. Liver abscess, leukocytosis trending down  awaiting culture growth, appreciate ID    2. Colonic diverticulosis. No e/o diverticulitis.    3. diarrhea. started after abx were initiated. seems to be improving. monitor for now.

## 2023-08-31 NOTE — PROGRESS NOTE ADULT - SUBJECTIVE AND OBJECTIVE BOX
Patient is a 68y old  Male who presents with a chief complaint of fever, night sweats, epigastric abd pain (30 Aug 2023 15:27)      SUBJECTIVE / OVERNIGHT EVENTS:    Patient seen and examined. no complaints.      Vital Signs Last 24 Hrs  T(C): 36.8 (31 Aug 2023 08:53), Max: 37.2 (31 Aug 2023 00:04)  T(F): 98.2 (31 Aug 2023 08:53), Max: 99 (31 Aug 2023 00:04)  HR: 78 (31 Aug 2023 08:53) (78 - 88)  BP: 112/70 (31 Aug 2023 08:53) (102/75 - 112/72)  BP(mean): --  RR: 18 (31 Aug 2023 08:53) (18 - 18)  SpO2: 97% (31 Aug 2023 08:53) (97% - 97%)    Parameters below as of 31 Aug 2023 00:04  Patient On (Oxygen Delivery Method): room air      I&O's Summary    30 Aug 2023 07:01  -  31 Aug 2023 07:00  --------------------------------------------------------  IN: 700 mL / OUT: 0 mL / NET: 700 mL      PE  GENERAL: NAD, AAOx3, fatigued  CHEST/LUNG: CTABL, No wheeze  HEART: Regular rate and rhythm; no murmur  ABDOMEN: Soft, Nontender, drain in place, Bowel sounds present  EXTREMITIES:  2+ Peripheral Pulses, No edema  NEURO: No focal deficits      LABS:                        12.0   12.70 )-----------( 533      ( 30 Aug 2023 05:18 )             36.8     08-31    135  |  98  |  15  ----------------------------<  156<H>  5.0   |  26  |  1.22    Ca    9.6      31 Aug 2023 07:07    TPro  8.0  /  Alb  3.5  /  TBili  0.3  /  DBili  x   /  AST  62<H>  /  ALT  159<H>  /  AlkPhos  396<H>  08-31      CAPILLARY BLOOD GLUCOSE            Urinalysis Basic - ( 31 Aug 2023 07:07 )    Color: x / Appearance: x / SG: x / pH: x  Gluc: 156 mg/dL / Ketone: x  / Bili: x / Urobili: x   Blood: x / Protein: x / Nitrite: x   Leuk Esterase: x / RBC: x / WBC x   Sq Epi: x / Non Sq Epi: x / Bacteria: x        RADIOLOGY & ADDITIONAL TESTS:    Imaging Personally Reviewed:  [x] YES  [ ] NO    Consultant(s) Notes Reviewed:  [x] YES  [ ] NO    MEDICATIONS  (STANDING):  chlorhexidine 2% Cloths 1 Application(s) Topical daily  piperacillin/tazobactam IVPB.. 3.375 Gram(s) IV Intermittent every 8 hours    MEDICATIONS  (PRN):      Care Discussed with Consultants/Other Providers [x] YES  [ ] NO    HEALTH ISSUES - PROBLEM Dx:  Liver abscess    Sepsis    Transaminitis    Thyroid nodule    Renal lesion

## 2023-08-31 NOTE — PHYSICAL THERAPY INITIAL EVALUATION ADULT - PERTINENT HX OF CURRENT PROBLEM, REHAB EVAL
67 y/o M w/ no significant PMhs who presented w/ epigastric abd pain x 2 weeks w/ associated fevers, night sweats, poor appetite.  hepatic abscesses, fever, leukocytosis, elevated liver enzymes  CT abd- complex hepatic cystic lesion measuring 5.8 cm with closer sign, suggestive of an abscess. additional cystic lesions in the left hepatic lobe measuring up to 3.2 cm likely additional abscesses.  blood cultures- NGTD  s/p IR drain placement 8/29

## 2023-08-31 NOTE — DISCHARGE NOTE PROVIDER - NSDCCPCAREPLAN_GEN_ALL_CORE_FT
PRINCIPAL DISCHARGE DIAGNOSIS  Diagnosis: Liver abscess  Assessment and Plan of Treatment: with Liver abscess with multiple liver cysts. S/P drain by IR on 8/29.   Was on zosyn, now switched to augmentin. Drain culture result with no growth. Continue antibiotic as recommended through 9/11. Repeat imaging out patient. Hepatic fdrain flush as recommended by IR      SECONDARY DISCHARGE DIAGNOSES  Diagnosis: Transaminitis  Assessment and Plan of Treatment: Your liver enzymes were elevated likely due to Liver abscess     PRINCIPAL DISCHARGE DIAGNOSIS  Diagnosis: Liver abscess  Assessment and Plan of Treatment: with Liver abscess with multiple liver cysts. S/P drain by IR on 8/29.   Was on zosyn, now switched to augmentin. Drain culture result with no growth. Continue antibiotic as recommended through 9/11. Repeat imaging out patient. Hepatic fdrain flush as recommended by IR      SECONDARY DISCHARGE DIAGNOSES  Diagnosis: Thyroid nodule  Assessment and Plan of Treatment: - complex isthmus nodule on ultrasound  - follow up with PMD and endocrine as out patient    Diagnosis: Renal lesion  Assessment and Plan of Treatment:   - renal ultra sound with  with cyst - benign   - follow up with PMD and endocrine as out patient      Diagnosis: Transaminitis  Assessment and Plan of Treatment: Your liver enzymes were elevated likely due to Liver abscess. Trending down slowly. Hold cholesterol medications. Repeat labs at your PMD office     PRINCIPAL DISCHARGE DIAGNOSIS  Diagnosis: Liver abscess  Assessment and Plan of Treatment: with Liver abscess with multiple liver cysts. S/P drain by IR on 8/29.   Was on zosyn, now switched to augmentin. Drain culture result with no growth. Continue antibiotic as recommended through 9/11. Hepatic fdrain flush as recommended by IR      SECONDARY DISCHARGE DIAGNOSES  Diagnosis: Thyroid nodule  Assessment and Plan of Treatment: - complex isthmus nodule on ultrasound  - follow up with PMD and endocrine as out patient    Diagnosis: Renal lesion  Assessment and Plan of Treatment:   - renal ultra sound with  with cyst - benign   - follow up with PMD and endocrine as out patient      Diagnosis: Transaminitis  Assessment and Plan of Treatment: Your liver enzymes were elevated likely due to Liver abscess. Trending down slowly. Hold cholesterol medications. Repeat labs at your PMD office     PRINCIPAL DISCHARGE DIAGNOSIS  Diagnosis: Liver abscess  Assessment and Plan of Treatment: with Liver abscess with multiple liver cysts. S/P drain by IR on 8/29.   Was on zosyn, now switched to augmentin. Drain culture result with no growth. Continue antibiotic as recommended through 9/11. Hepatic drain flush as recommended by IR. Repeat CT abdomen after completion of antibiotic.      SECONDARY DISCHARGE DIAGNOSES  Diagnosis: Thyroid nodule  Assessment and Plan of Treatment: - complex isthmus nodule on ultrasound  - follow up with PMD and endocrine as out patient    Diagnosis: Renal lesion  Assessment and Plan of Treatment:   - renal ultra sound with  with cyst - benign   - follow up with PMD and endocrine as out patient      Diagnosis: Transaminitis  Assessment and Plan of Treatment: Your liver enzymes were elevated likely due to Liver abscess. Trending down slowly. Hold cholesterol medications. Repeat labs at your PMD office     PRINCIPAL DISCHARGE DIAGNOSIS  Diagnosis: Liver abscess  Assessment and Plan of Treatment: with Liver abscess with multiple liver cysts. S/P drain by IR on 8/29.   Was on zosyn, now switched to augmentin. Drain culture result with no growth. Continue antibiotic as recommended through 9/11. Hepatic drain flush as recommended by IR. Repeat CT abdomen after completion of antibiotic. Follow up with Dr Stevens on 9/8/23 call for appointment.      SECONDARY DISCHARGE DIAGNOSES  Diagnosis: Thyroid nodule  Assessment and Plan of Treatment: - complex isthmus nodule on ultrasound  - follow up with PMD and endocrine as out patient    Diagnosis: Renal lesion  Assessment and Plan of Treatment:   - renal ultra sound with  with cyst - benign   - follow up with PMD and endocrine as out patient      Diagnosis: Transaminitis  Assessment and Plan of Treatment: Your liver enzymes were elevated likely due to Liver abscess. Trending down slowly. Hold cholesterol medications. Repeat labs at your PMD office     PRINCIPAL DISCHARGE DIAGNOSIS  Diagnosis: Liver abscess  Assessment and Plan of Treatment: with Liver abscess with multiple liver cysts. S/P drain by IR on 8/29.   Was on zosyn, now switched to augmentin. Drain culture result with no growth. Continue antibiotic as recommended through 9/11. Hepatic drain flush as recommended by IR. Repeat CT abdomen after completion of antibiotic. Follow up with Dr Stevens on 9/8/23 call for appointment.      SECONDARY DISCHARGE DIAGNOSES  Diagnosis: Thyroid nodule  Assessment and Plan of Treatment: - complex isthmus nodule on ultrasound  - follow up with PMD and endocrine as out patient    Diagnosis: Renal lesion  Assessment and Plan of Treatment:   - renal ultra sound with  with cyst - benign   - follow up with PMD and endocrine as out patient      Diagnosis: Transaminitis  Assessment and Plan of Treatment: Your liver enzymes were elevated likely due to Liver abscess. Trending down slowly. Repeat labs at your PMD office     PRINCIPAL DISCHARGE DIAGNOSIS  Diagnosis: Liver abscess  Assessment and Plan of Treatment: with Liver abscess with multiple liver cysts. S/P drain by IR on 8/29.   Was on zosyn, now switched to augmentin. Drain culture result with no growth. Continue antibiotic as recommended through 9/11. Hepatic drain flush as recommended by IR. Repeat CT abdomen after completion of antibiotic. Follow up with Dr Stevens on 9/8/23 call for appointment.      SECONDARY DISCHARGE DIAGNOSES  Diagnosis: Thyroid nodule  Assessment and Plan of Treatment: - complex isthmus nodule on ultrasound  - follow up with PMD and endocrine as out patient    Diagnosis: Renal lesion  Assessment and Plan of Treatment:   - renal ultra sound with  with cyst - benign   - follow up with PMD and endocrine as out patient      Diagnosis: Transaminitis  Assessment and Plan of Treatment: Your liver enzymes were elevated likely due to Liver abscess. Trending down slowly. Hold cholesterol medication. Repeat labs at your PMD office.

## 2023-08-31 NOTE — DISCHARGE NOTE PROVIDER - NSDCFUADDAPPT_GEN_ALL_CORE_FT
You have an appointment with IR on 9/7/23 @12:30pm for a cat scan and drain study. Should you need to reschedule you may call the IR booking office @ [phone]. Please feel free to contact us at [phone] if any problems arise. After 6PM, Monday through Friday, on weekends and on holidays, please call [phone] and ask for the radiology resident on call to be paged.  APPTS ARE READY TO BE MADE: [x ] YES    Best Family or Patient Contact (if needed):    Additional Information about above appointments (if needed):    1:   2:   3:     Other comments or requests:       You have an appointment with IR on 9/7/23 @12:30pm for a cat scan and drain study. Should you need to reschedule you may call the IR booking office @ [phone]. Please feel free to contact us at [phone] if any problems arise. After 6PM, Monday through Friday, on weekends and on holidays, please call [phone] and ask for the radiology resident on call to be paged.  APPTS ARE READY TO BE MADE: [x ] YES    Best Family or Patient Contact (if needed):    Additional Information about above appointments (if needed):    1:   2:   3:     Other comments or requests:       You have an appointment with IR on 9/7/23 @12:30pm for a cat scan and drain study. Should you need to reschedule you may call the IR booking office @ [phone]. Please feel free to contact us at [phone] if any problems arise. After 6PM, Monday through Friday, on weekends and on holidays, please call [phone] and ask for the radiology resident on call to be paged.     Patient was scheduled with Dr. Carlin on 9/7 at 4:45pm at [facility].

## 2023-08-31 NOTE — DISCHARGE NOTE PROVIDER - PROVIDER TOKENS
FREE:[LAST:[Mikey],FIRST:[Nahun],PHONE:[phone]],FAX:[(   )    -],ADDRESS:[[address] [place] [address] [place] [address]],SCHEDULEDAPPT:[09/07/2023],SCHEDULEDAPPTTIME:[12:30 PM]] FREE:[LAST:[Jensen],FIRST:[Nahun],PHONE:[phone]],FAX:[(   )    -],ADDRESS:[[address] [place] [address] [place] [address]],SCHEDULEDAPPT:[09/07/2023],SCHEDULEDAPPTTIME:[12:30 PM]],PROVIDER:[TOKEN:[[id number]]] PROVIDER:[TOKEN:[[id number]]],FREE:[LAST:[Hunt],FIRST:[Nahun],PHONE:[phone]],FAX:[(   )    -],ADDRESS:[[address] [place] [address] [place] [address]],SCHEDULEDAPPT:[09/07/2023],SCHEDULEDAPPTTIME:[12:30 PM]],PROVIDER:[TOKEN:[[id number]]]

## 2023-08-31 NOTE — DISCHARGE NOTE PROVIDER - HOSPITAL COURSE
69 y/o M w/ no significant PMhs who presented w/ epigastric abd pain x 2 weeks w/ associated fevers, night sweats, poor appetite.  CT abd- complex hepatic cystic lesion measuring 5.8 cm with closer sign, suggestive of an abscess. additional cystic lesions in the left hepatic lobe measuring up to 3.2 cm likely additional abscesses.  s/p IR drain placement 8/29, culture no growth  blood cultures- NGTD  dc onaugmentin 875-125mg bid   plan for 2 week course of antibiotics from drain placement w/ last day 9/11 pending repeat imaging outpt 67 y/o M w/ no significant PMhs who presented w/ epigastric abd pain x 2 weeks w/ associated fevers, night sweats, poor appetite.  CT abd- complex hepatic cystic lesion measuring 5.8 cm with closer sign, suggestive of an abscess. additional cystic lesions in the left hepatic lobe measuring up to 3.2 cm likely additional abscesses.  s/p IR drain placement 8/29, culture no growth  blood cultures no growth.  dc onaugmentin 875-125mg bid   plan for 2 week course of antibiotics from drain placement w/ last day 9/11 pending repeat imaging outpt, and follow up with ID next Friday 9/8.  Dispo/disch/med rec DW DR Ellis. Cleared for discharge. 69 y/o M w/ no significant PMhs who presented w/ epigastric abd pain x 2 weeks w/ associated fevers, night sweats, poor appetite.  CT abd- complex hepatic cystic lesion measuring 5.8 cm with closer sign, suggestive of an abscess. additional cystic lesions in the left hepatic lobe measuring up to 3.2 cm likely additional abscesses.  s/p IR drain placement 8/29, culture no growth  blood cultures no growth.  dc onaugmentin 875-125mg bid   plan for 2 week course of antibiotics from drain placement w/ last day 9/11 pending repeat imaging outpt, and follow up with ID next Friday 9/8.  Dispo/disch/med rec DW DR Ellis. Recommended to resume atorvastatin and asa, Cleared for discharge. 67 y/o M w/ no significant PMhs who presented w/ epigastric abd pain x 2 weeks w/ associated fevers, night sweats, poor appetite.  CT abd- complex hepatic cystic lesion measuring 5.8 cm with closer sign, suggestive of an abscess. additional cystic lesions in the left hepatic lobe measuring up to 3.2 cm likely additional abscesses.  s/p IR drain placement 8/29, culture no growth  blood cultures no growth.  dc onaugmentin 875-125mg bid   plan for 2 week course of antibiotics from drain placement w/ last day 9/11 pending repeat imaging outpt, and follow up with ID next Friday 9/8.  Dispo/disch/med rec DW DR Ellis. Recommended cont hold statin,  and resume asa, Cleared for discharge.

## 2023-08-31 NOTE — DISCHARGE NOTE PROVIDER - CARE PROVIDER_API CALL
Nahun Jensen  [address] [place] [address]   [place] [address]  Phone: [phone]  Fax: (   )    -  Scheduled Appointment: 09/07/2023 12:30 PM   Nahun Jensen  [address] [place] [address]   [place] [address]  Phone: [phone]  Fax: (   )    -  Scheduled Appointment: 09/07/2023 12:30 PM    Ravindra Carlin  Internal Medicine  [address] [facility], [address] [place] [address]  Phone: [phone]  Fax: [phone]  Follow Up Time:    Ravindra Carlin  Internal Medicine  [address] [facility], [address]rd [place] [address]  Phone: [phone]  Fax: [phone]  Follow Up Time:     Nahun Jensen  [address] [place] [address] [place] [address]  Phone: [phone]  Fax: (   )    -  Scheduled Appointment: 09/07/2023 12:30 PM    Dinh Stevens  Infectious Disease  [address] [facility], [address]  [place] [address]  Phone: [phone]  Fax: [phone]  Follow Up Time:

## 2023-08-31 NOTE — PROGRESS NOTE ADULT - SUBJECTIVE AND OBJECTIVE BOX
Interventional Radiology Follow-Up Note    This is a 68y Male s/p hepatic abscess drainage on 8/29/23 in Interventional Radiology with Dr Jensen.     S: Patient seen and examined @ bedside. No complaints offered.     Medication:     piperacillin/tazobactam IVPB..: (08-31)  piperacillin/tazobactam IVPB..: (08-30)    Vitals:   T(F): 98.2, Max: 99 (00:04)  HR: 78  BP: 112/70  RR: 18  SpO2: 97%    Physical Exam:  General: Nontoxic, in NAD, A&O x3.  Abdomen: soft, NTND, no peritoneal signs.  Drain Device: Drain intact attached to bag with 25cc of output. Dressing clean, dry, intact. Drain flushed with 10cc NS with some resistance met initially. Able to flush an additional 10cc w/o issues. Drain connected a bulb, +fluid return noted, no pericatheter leakage.     LABS:  WBC -- / Hgb -- / Hct -- / Plt --  Na 135 / K 5.0 / CO2 26 / Cl 98 / BUN 15 / Cr 1.22 / Glucose 156   / AST 62 / Alk Phos 396 / Tbili 0.3  Ptt -- / Pt -- / INR --    Preliminary Report:  No growth    24hr Drain output: no documented output    Assessment/Plan:  68M no PMH with abdominal pain and fevers, leukocytosis, found to have likely abscess on CT. s/p hepatic abscess drainage on 8/29/23 in Interventional Radiology with Dr Jensen.     -Continue to monitor and record drain output.   -flush drain with 10cc NS BID forward only; DO NOT aspirate  -change dressing q3 days or when dressing is saturated  -IR follow up appointment facilitated for  9/7/23 @12:30pm. Should patient need to reschedule can call the IR booking office at [phone].   -they will benefit from VNS service to help with drainage catheter care; they should continue same drainage catheter care as an outpatient.  -continue global management per primary team   -Greater than 50% of the encounter was spent counseling and/ or coordination of care on drain care and follow up.   -Please call IR at extension [phone] with any questions, concerns, or issues regarding above.      Rose Diaz PA-C  Available on teams

## 2023-08-31 NOTE — PROGRESS NOTE ADULT - ASSESSMENT
69 y/o M w/ no significant PMhs who presented w/ epigastric abd pain x 2 weeks w/ associated fevers, night sweats, poor appetite.    hepatic abscesses, fever  leukocytosis, elevated liver enzymes  CT abd- complex hepatic cystic lesion measuring 5.8 cm with closer sign, suggestive of an abscess. additional cystic lesions in the left hepatic lobe measuring up to 3.2 cm likely additional abscesses.  blood cultures- NGTD  s/p IR drain placement 8/29  afebrile x ~48 hours    Recommendations  c/w zosyn for now  f/u IR culture   - if culture remains without growth tomorrow will transition to augmentin 875-125mg PO bid  trend wbc    discussed w/ medicine attending  Dinh Stevens M.D.  [facility], Division of Infectious Diseases  [phone]  After 5pm on weekdays and all day on weekends - please call [phone]

## 2023-08-31 NOTE — DISCHARGE NOTE PROVIDER - NSDCCPTREATMENT_GEN_ALL_CORE_FT
PRINCIPAL PROCEDURE  Procedure: Abdominal abscess drainage  Findings and Treatment: You had a drain placed in your liver by Dr. Jensen on 8/29/23 in [facility] (IR).   Monitor site for any sign or symptoms of infection (painful red skin, green/ yellow foul smelling discharge from the insertion site, fever, chills, leakage around drain site).   Flush drain with 10mL NS twice a day. If you meet resistance upon flushing, STOP and contact IR.   Empty drainage bag or bulb daily and record output. Once output is <10mL in a 24hr period or if there is a sudden decrease in output please contact IR to schedule  an appointment.  Drain care:  -Disconnect tubing (tube attached to bag/ bulb)  from the catheter (catheter going into skin)  -Clean catheter with alcohol swab  -Twist on the flush syringe to the catheter (be sure to push the air out of syringe)  -Flush catheter with 10mL (DO NOT pull back on syringe). If you meet resistance upon flushing, STOP and contact IR.   -Disconnect syringe from catheter and reconnect drainage bag or bulb.  Dressing care:  -Wash hands thoroughly with water and soap for at least 20 seconds.   -take off old dressing and clean around drain site with gauze soaked with warm water  -After cleaning the site, dry and replace dressing with a new gauze and tegaderm.   -Place one piece of gauze underneath the drain and another piece of gauze on top of drain.   -Apply tegaderm (clear dressing) on top.  -Change dressing every 3 days or when soiled

## 2023-08-31 NOTE — DISCHARGE NOTE PROVIDER - NSDCMRMEDTOKEN_GEN_ALL_CORE_FT
aspirin 81 mg oral delayed release tablet: 1 tab(s) orally 5 times a week (NOT on Tues / Thurs )  atorvastatin 10 mg oral tablet: 1 tab(s) orally once a day  otc supplements: fish oil / vitamin d3 / vitamin b complex   amoxicillin-clavulanate 875 mg-125 mg oral tablet: 1 tab(s) orally every 12 hours  aspirin 81 mg oral delayed release tablet: 1 tab(s) orally 5 times a week (NOT on Tues / Thurs )  atorvastatin 10 mg oral tablet: 1 tab(s) orally once a day   amoxicillin-clavulanate 875 mg-125 mg oral tablet: 1 tab(s) orally every 12 hours   amoxicillin-clavulanate 875 mg-125 mg oral tablet: 1 tab(s) orally every 12 hours  aspirin 81 mg oral tablet: 1 tab(s) orally once a day  atorvastatin 10 mg oral tablet: 1 tab(s) orally once a day (at bedtime)   amoxicillin-clavulanate 875 mg-125 mg oral tablet: 1 tab(s) orally every 12 hours  aspirin 81 mg oral tablet: 1 tab(s) orally once a day

## 2023-08-31 NOTE — PROGRESS NOTE ADULT - SUBJECTIVE AND OBJECTIVE BOX
[facility], Division of Infectious Diseases  DIAMOND Moore Y. Patel, S. Shah, G. Rodney  [phone]  ([phone] - weekdays after 5pm and weekends)    Name: JADA EDWARDS  Age/Gender: 68y Male  MRN: [id number]    Interval History:  Notes reviewed.   No concerning overnight events.  Afebrile.   reports abd pain resolved  no diarrhea    Allergies: No Known Allergies      Objective:  Vitals:   T(F): 98.2 (08-31-23 @ 08:53), Max: 99 (08-31-23 @ 00:04)  HR: 78 (08-31-23 @ 08:53) (78 - 88)  BP: 112/70 (08-31-23 @ 08:53) (102/75 - 112/72)  RR: 18 (08-31-23 @ 08:53) (18 - 18)  SpO2: 97% (08-31-23 @ 08:53) (97% - 97%)  Physical Examination:  General: no acute distress  HEENT: anicteric  Cardio: S1, S2, normal rate  Resp: clear to auscultation bilaterally  Abd: soft, NT, ND, FRITZ drain w/ small amount of yellow fluid  Ext: no LE edema  Skin: warm, dry    Laboratory Studies:  CBC:                       12.0   12.70 )-----------( 533      ( 30 Aug 2023 05:18 )             36.8     WBC Trend:  12.70 08-30-23 @ 05:18  14.93 08-29-23 @ 07:29  14.58 08-28-23 @ 07:03  15.88 08-27-23 @ 08:06  15.10 08-27-23 @ 07:09  15.82 08-25-23 @ 17:08    CMP: 08-31    135  |  98  |  15  ----------------------------<  156<H>  5.0   |  26  |  1.22    Ca    9.6      31 Aug 2023 07:07    TPro  8.0  /  Alb  3.5  /  TBili  0.3  /  DBili  x   /  AST  62<H>  /  ALT  159<H>  /  AlkPhos  396<H>  08-31      LIVER FUNCTIONS - ( 31 Aug 2023 07:07 )  Alb: 3.5 g/dL / Pro: 8.0 g/dL / ALK PHOS: 396 U/L / ALT: 159 U/L / AST: 62 U/L / GGT: x             Urinalysis Basic - ( 31 Aug 2023 07:07 )    Color: x / Appearance: x / SG: x / pH: x  Gluc: 156 mg/dL / Ketone: x  / Bili: x / Urobili: x   Blood: x / Protein: x / Nitrite: x   Leuk Esterase: x / RBC: x / WBC x   Sq Epi: x / Non Sq Epi: x / Bacteria: x      Microbiology: reviewed     Culture - Body Fluid with Gram Stain (collected 08-29-23 @ 19:42)  Source: .Body Fluid liver abscess fluid  Gram Stain (08-30-23 @ 02:26):    Numerous polymorphonuclear leukocytes seen per low power field    No organisms seen per oil power field    Limited volume of specimen received, Unable to centrifuge/concentrate    specimen. Culture results may be compromised.  Preliminary Report (08-30-23 @ 22:32):    No growth    Culture - Urine (collected 08-25-23 @ 19:54)  Source: Clean Catch Clean Catch (Midstream)  Final Report (08-26-23 @ 21:25):    <10,000 CFU/mL Normal Urogenital Sunita    Culture - Blood (collected 08-25-23 @ 16:55)  Source: .Blood Blood-Peripheral  Final Report (08-30-23 @ 22:00):    No growth at 5 days    Culture - Blood (collected 08-25-23 @ 16:40)  Source: .Blood Blood-Peripheral  Final Report (08-30-23 @ 22:00):    No growth at 5 days        Radiology: reviewed     Medications:  chlorhexidine 2% Cloths 1 Application(s) Topical daily  piperacillin/tazobactam IVPB.. 3.375 Gram(s) IV Intermittent every 8 hours    Antimicrobials:  piperacillin/tazobactam IVPB.. 3.375 Gram(s) IV Intermittent every 8 hours

## 2023-08-31 NOTE — DISCHARGE NOTE PROVIDER - ATTENDING DISCHARGE PHYSICAL EXAMINATION:
GENERAL: NAD, AAOx3  CHEST/LUNG: CTABL, No wheeze  HEART: Regular rate and rhythm; No murmur  ABDOMEN: Soft, Nontender, Nondistended; Bowel sounds present, drain in place  EXTREMITIES:  2+ Peripheral Pulses, No edema

## 2023-08-31 NOTE — PROGRESS NOTE ADULT - ASSESSMENT
68M c no signif PMHx, pw sepsis 2/2 likely liver abscesses of unclear etiol.    Sepsis  Liver abscess  - complex cysts suspicious for liver abscesses  - unclear etiol  - CEA and CA 19-9 wnl-- less likely malignant    - IR sp hepatic abscess drainage, fu cultures, prelim no growth, leukocytosis downtrending  - cont empiric zosyn for now, ID following  - drain care    Transaminitis  - likely 2/2 liver lesions as above  - hold statin for now    Thyroid nodule  - complex isthmus nodule on ultrasound  - f/u pmd and endo as outpt    Renal lesion  - renal ult with cyst - benign   - f/u pmd and renal as outpt    dvt ppx: lovenox     Please call [facility] with questions [phone].

## 2023-08-31 NOTE — PROGRESS NOTE ADULT - SUBJECTIVE AND OBJECTIVE BOX
Interval Events:   had diarrhea the past few days, none today     Hospital Medications:  chlorhexidine 2% Cloths 1 Application(s) Topical daily  piperacillin/tazobactam IVPB.. 3.375 Gram(s) IV Intermittent every 8 hours        Review of Systems:  General:  No wt loss, fevers, chills, night sweats, fatigue,   Eyes:  Good vision, no reported pain  ENT:  No sore throat, pain, runny nose, dysphagia  CV:  No pain, palpitations, hypo/hypertension  Resp:  No dyspnea, cough, tachypnea, wheezing  GI:  See HPI  :  No pain, bleeding, incontinence, nocturia  Muscle:  No pain, weakness  Neuro:  No weakness, tingling, memory problems  Psych:  No fatigue, insomnia, mood problems, depression  Endocrine:  No polyuria, polydipsia, cold/heat intolerance  Heme:  No petechiae, ecchymosis, easy bruisability  Integumentary:  No rash, edema    PHYSICAL EXAM:   Vital Signs:  Vital Signs Last 24 Hrs  T(C): 36.8 (30 Aug 2023 10:00), Max: 38.2 (29 Aug 2023 15:13)  T(F): 98.2 (30 Aug 2023 10:00), Max: 100.7 (29 Aug 2023 15:13)  HR: 91 (30 Aug 2023 10:00) (87 - 99)  BP: 108/70 (30 Aug 2023 10:00) (108/70 - 124/80)  BP(mean): --  RR: 18 (30 Aug 2023 10:00) (18 - 18)  SpO2: 95% (30 Aug 2023 10:00) (95% - 97%)    Parameters below as of 30 Aug 2023 10:00  Patient On (Oxygen Delivery Method): room air      Daily     Daily Weight in k.4 (30 Aug 2023 08:06)      PHYSICAL EXAM:     GENERAL:  Appears stated age, well-groomed, well-nourished, no distress  HEENT:  NC/AT,  conjunctivae anicteric, clear and pink,   NECK: supple, trachea midline  CHEST:  Full & symmetric excursion, no increased effort, breath sounds clear  HEART:  Regular rhythm, no JVD  ABDOMEN:  Soft, non-tender, non-distended, normoactive bowel sounds,  no masses , no hepatosplenomegaly  EXTREMITIES:  no cyanosis,clubbing or edema  SKIN:  No rash, erythema, or, ecchymoses, no jaundice  NEURO:  Alert, non-focal, no asterixis  PSYCH: Appropriate affect, oriented to place and time  RECTAL: Deferred      LABS Personally reviewed by me:                        12.0   12 )-----------( 533      ( 30 Aug 2023 05:18 )             36.8     Mean Cell Volume: 86.4 fl (- @ 05:18)        134<L>  |  95<L>  |  12  ----------------------------<  148<H>  4.5   |  25  |  1.39<H>    Ca    9.3      30 Aug 2023 05:18    TPro  7.8  /  Alb  3.3  /  TBili  0.5  /  DBili  x   /  AST  91<H>  /  ALT  177<H>  /  AlkPhos  411<H>      LIVER FUNCTIONS - ( 30 Aug 2023 05:18 )  Alb: 3.3 g/dL / Pro: 7.8 g/dL / ALK PHOS: 411 U/L / ALT: 177 U/L / AST: 91 U/L / GGT: x             Urinalysis Basic - ( 30 Aug 2023 05:18 )    Color: x / Appearance: x / SG: x / pH: x  Gluc: 148 mg/dL / Ketone: x  / Bili: x / Urobili: x   Blood: x / Protein: x / Nitrite: x   Leuk Esterase: x / RBC: x / WBC x   Sq Epi: x / Non Sq Epi: x / Bacteria: x                              12.0   12 )-----------( 533      ( 30 Aug 2023 05:18 )             36.8                         11.6   14.93 )-----------( 529      ( 29 Aug 2023 07:29 )             36.3                         11.7   14.58 )-----------( 496      ( 28 Aug 2023 07:03 )             35.2       Imaging personally reviewed by me:

## 2023-09-01 ENCOUNTER — TRANSCRIPTION ENCOUNTER (OUTPATIENT)
Age: 68
End: 2023-09-01

## 2023-09-01 VITALS
SYSTOLIC BLOOD PRESSURE: 116 MMHG | RESPIRATION RATE: 18 BRPM | DIASTOLIC BLOOD PRESSURE: 79 MMHG | HEART RATE: 98 BPM | TEMPERATURE: 98 F | OXYGEN SATURATION: 98 %

## 2023-09-01 DIAGNOSIS — K75.0 ABSCESS OF LIVER: ICD-10-CM

## 2023-09-01 LAB
ALBUMIN SERPL ELPH-MCNC: 3.5 G/DL — SIGNIFICANT CHANGE UP (ref 3.3–5)
ALP SERPL-CCNC: 346 U/L — HIGH (ref 40–120)
ALT FLD-CCNC: 145 U/L — HIGH (ref 10–45)
ANION GAP SERPL CALC-SCNC: 14 MMOL/L — SIGNIFICANT CHANGE UP (ref 5–17)
AST SERPL-CCNC: 53 U/L — HIGH (ref 10–40)
BILIRUB SERPL-MCNC: 0.3 MG/DL — SIGNIFICANT CHANGE UP (ref 0.2–1.2)
BUN SERPL-MCNC: 16 MG/DL — SIGNIFICANT CHANGE UP (ref 7–23)
CALCIUM SERPL-MCNC: 9.3 MG/DL — SIGNIFICANT CHANGE UP (ref 8.4–10.5)
CHLORIDE SERPL-SCNC: 97 MMOL/L — SIGNIFICANT CHANGE UP (ref 96–108)
CO2 SERPL-SCNC: 24 MMOL/L — SIGNIFICANT CHANGE UP (ref 22–31)
CREAT SERPL-MCNC: 1.16 MG/DL — SIGNIFICANT CHANGE UP (ref 0.5–1.3)
EGFR: 69 ML/MIN/1.73M2 — SIGNIFICANT CHANGE UP
GLUCOSE SERPL-MCNC: 155 MG/DL — HIGH (ref 70–99)
HCT VFR BLD CALC: 39.6 % — SIGNIFICANT CHANGE UP (ref 39–50)
HGB BLD-MCNC: 12.5 G/DL — LOW (ref 13–17)
MCHC RBC-ENTMCNC: 27.6 PG — SIGNIFICANT CHANGE UP (ref 27–34)
MCHC RBC-ENTMCNC: 31.6 GM/DL — LOW (ref 32–36)
MCV RBC AUTO: 87.4 FL — SIGNIFICANT CHANGE UP (ref 80–100)
NRBC # BLD: 0 /100 WBCS — SIGNIFICANT CHANGE UP (ref 0–0)
PLATELET # BLD AUTO: 562 K/UL — HIGH (ref 150–400)
POTASSIUM SERPL-MCNC: 4.5 MMOL/L — SIGNIFICANT CHANGE UP (ref 3.5–5.3)
POTASSIUM SERPL-SCNC: 4.5 MMOL/L — SIGNIFICANT CHANGE UP (ref 3.5–5.3)
PROT SERPL-MCNC: 8 G/DL — SIGNIFICANT CHANGE UP (ref 6–8.3)
RBC # BLD: 4.53 M/UL — SIGNIFICANT CHANGE UP (ref 4.2–5.8)
RBC # FLD: 13.1 % — SIGNIFICANT CHANGE UP (ref 10.3–14.5)
SODIUM SERPL-SCNC: 135 MMOL/L — SIGNIFICANT CHANGE UP (ref 135–145)
T3FREE SERPL-MCNC: 2.2 PG/ML — SIGNIFICANT CHANGE UP
T4 FREE SERPL-MCNC: 1.03 NG/DL — SIGNIFICANT CHANGE UP
WBC # BLD: 9.4 K/UL — SIGNIFICANT CHANGE UP (ref 3.8–10.5)
WBC # FLD AUTO: 9.4 K/UL — SIGNIFICANT CHANGE UP (ref 3.8–10.5)

## 2023-09-01 PROCEDURE — 84439 ASSAY OF FREE THYROXINE: CPT

## 2023-09-01 PROCEDURE — 82947 ASSAY GLUCOSE BLOOD QUANT: CPT

## 2023-09-01 PROCEDURE — 83735 ASSAY OF MAGNESIUM: CPT

## 2023-09-01 PROCEDURE — 86901 BLOOD TYPING SEROLOGIC RH(D): CPT

## 2023-09-01 PROCEDURE — 86803 HEPATITIS C AB TEST: CPT

## 2023-09-01 PROCEDURE — 84480 ASSAY TRIIODOTHYRONINE (T3): CPT

## 2023-09-01 PROCEDURE — 87075 CULTR BACTERIA EXCEPT BLOOD: CPT

## 2023-09-01 PROCEDURE — 80053 COMPREHEN METABOLIC PANEL: CPT

## 2023-09-01 PROCEDURE — 87086 URINE CULTURE/COLONY COUNT: CPT

## 2023-09-01 PROCEDURE — 85025 COMPLETE CBC W/AUTO DIFF WBC: CPT

## 2023-09-01 PROCEDURE — 87337 ENTAMOEB HIST GROUP AG IA: CPT

## 2023-09-01 PROCEDURE — 82330 ASSAY OF CALCIUM: CPT

## 2023-09-01 PROCEDURE — 86480 TB TEST CELL IMMUN MEASURE: CPT

## 2023-09-01 PROCEDURE — 85730 THROMBOPLASTIN TIME PARTIAL: CPT

## 2023-09-01 PROCEDURE — 82378 CARCINOEMBRYONIC ANTIGEN: CPT

## 2023-09-01 PROCEDURE — 82803 BLOOD GASES ANY COMBINATION: CPT

## 2023-09-01 PROCEDURE — 87205 SMEAR GRAM STAIN: CPT

## 2023-09-01 PROCEDURE — 85014 HEMATOCRIT: CPT

## 2023-09-01 PROCEDURE — 84295 ASSAY OF SERUM SODIUM: CPT

## 2023-09-01 PROCEDURE — 86900 BLOOD TYPING SEROLOGIC ABO: CPT

## 2023-09-01 PROCEDURE — 80074 ACUTE HEPATITIS PANEL: CPT

## 2023-09-01 PROCEDURE — 71260 CT THORAX DX C+: CPT | Mod: MA

## 2023-09-01 PROCEDURE — 86850 RBC ANTIBODY SCREEN: CPT

## 2023-09-01 PROCEDURE — 84100 ASSAY OF PHOSPHORUS: CPT

## 2023-09-01 PROCEDURE — 74177 CT ABD & PELVIS W/CONTRAST: CPT | Mod: MA

## 2023-09-01 PROCEDURE — 49405 IMAGE CATH FLUID COLXN VISC: CPT

## 2023-09-01 PROCEDURE — 87070 CULTURE OTHR SPECIMN AEROBIC: CPT

## 2023-09-01 PROCEDURE — 85610 PROTHROMBIN TIME: CPT

## 2023-09-01 PROCEDURE — 36415 COLL VENOUS BLD VENIPUNCTURE: CPT

## 2023-09-01 PROCEDURE — C1729: CPT

## 2023-09-01 PROCEDURE — 85018 HEMOGLOBIN: CPT

## 2023-09-01 PROCEDURE — 86753 PROTOZOA ANTIBODY NOS: CPT

## 2023-09-01 PROCEDURE — 83605 ASSAY OF LACTIC ACID: CPT

## 2023-09-01 PROCEDURE — 76536 US EXAM OF HEAD AND NECK: CPT

## 2023-09-01 PROCEDURE — 87040 BLOOD CULTURE FOR BACTERIA: CPT

## 2023-09-01 PROCEDURE — 76770 US EXAM ABDO BACK WALL COMP: CPT

## 2023-09-01 PROCEDURE — 84132 ASSAY OF SERUM POTASSIUM: CPT

## 2023-09-01 PROCEDURE — 0225U NFCT DS DNA&RNA 21 SARSCOV2: CPT

## 2023-09-01 PROCEDURE — 99285 EMERGENCY DEPT VISIT HI MDM: CPT | Mod: 25

## 2023-09-01 PROCEDURE — 97161 PT EVAL LOW COMPLEX 20 MIN: CPT

## 2023-09-01 PROCEDURE — 96365 THER/PROPH/DIAG IV INF INIT: CPT

## 2023-09-01 PROCEDURE — 84484 ASSAY OF TROPONIN QUANT: CPT

## 2023-09-01 PROCEDURE — 81001 URINALYSIS AUTO W/SCOPE: CPT

## 2023-09-01 PROCEDURE — 84443 ASSAY THYROID STIM HORMONE: CPT

## 2023-09-01 PROCEDURE — 86703 HIV-1/HIV-2 1 RESULT ANTBDY: CPT

## 2023-09-01 PROCEDURE — 83690 ASSAY OF LIPASE: CPT

## 2023-09-01 PROCEDURE — 87077 CULTURE AEROBIC IDENTIFY: CPT

## 2023-09-01 PROCEDURE — 85027 COMPLETE CBC AUTOMATED: CPT

## 2023-09-01 PROCEDURE — 86301 IMMUNOASSAY TUMOR CA 19-9: CPT

## 2023-09-01 PROCEDURE — 82435 ASSAY OF BLOOD CHLORIDE: CPT

## 2023-09-01 RX ADMIN — Medication 1 TABLET(S): at 13:12

## 2023-09-01 RX ADMIN — PIPERACILLIN AND TAZOBACTAM 25 GRAM(S): 4; .5 INJECTION, POWDER, LYOPHILIZED, FOR SOLUTION INTRAVENOUS at 05:28

## 2023-09-01 RX ADMIN — CHLORHEXIDINE GLUCONATE 1 APPLICATION(S): 213 SOLUTION TOPICAL at 13:13

## 2023-09-01 NOTE — PROGRESS NOTE ADULT - SUBJECTIVE AND OBJECTIVE BOX
Chief Complaint:  Patient is a 68y old  Male who presents with a chief complaint of fever, night sweats, epigastric abd pain (01 Sep 2023 10:00)      Date of service 09-01-23 @ 14:56      Interval Events:   no acute events     Hospital Medications:  amoxicillin  875 milliGRAM(s)/clavulanate 1 Tablet(s) Oral every 12 hours  chlorhexidine 2% Cloths 1 Application(s) Topical daily        Review of Systems:  General:  No wt loss, fevers, chills, night sweats, fatigue,   Eyes:  Good vision, no reported pain  ENT:  No sore throat, pain, runny nose, dysphagia  CV:  No pain, palpitations, hypo/hypertension  Resp:  No dyspnea, cough, tachypnea, wheezing  GI:  See HPI  :  No pain, bleeding, incontinence, nocturia  Muscle:  No pain, weakness  Neuro:  No weakness, tingling, memory problems  Psych:  No fatigue, insomnia, mood problems, depression  Endocrine:  No polyuria, polydipsia, cold/heat intolerance  Heme:  No petechiae, ecchymosis, easy bruisability  Integumentary:  No rash, edema    PHYSICAL EXAM:   Vital Signs:  Vital Signs Last 24 Hrs  T(C): 36.2 (01 Sep 2023 09:44), Max: 36.7 (31 Aug 2023 15:31)  T(F): 97.1 (01 Sep 2023 09:44), Max: 98 (31 Aug 2023 15:31)  HR: 84 (01 Sep 2023 09:44) (83 - 91)  BP: 109/64 (01 Sep 2023 09:44) (109/64 - 111/70)  BP(mean): --  RR: 18 (01 Sep 2023 09:44) (18 - 18)  SpO2: 95% (01 Sep 2023 09:44) (94% - 96%)    Parameters below as of 01 Sep 2023 09:44  Patient On (Oxygen Delivery Method): room air      Daily     Daily       PHYSICAL EXAM:     GENERAL:  Appears stated age, well-groomed, well-nourished, no distress  HEENT:  NC/AT,  conjunctivae anicteric, clear and pink,   NECK: supple, trachea midline  CHEST:  Full & symmetric excursion, no increased effort, breath sounds clear  HEART:  Regular rhythm, no JVD  ABDOMEN:  Soft, non-tender, non-distended, normoactive bowel sounds,  no masses , no hepatosplenomegaly  EXTREMITIES:  no cyanosis,clubbing or edema  SKIN:  No rash, erythema, or, ecchymoses, no jaundice  NEURO:  Alert, non-focal, no asterixis  PSYCH: Appropriate affect, oriented to place and time  RECTAL: Deferred      LABS Personally reviewed by me:                        12.5   9.40  )-----------( 562      ( 01 Sep 2023 07:02 )             39.6     Mean Cell Volume: 87.4 fl (09-01-23 @ 07:02)    09-01    135  |  97  |  16  ----------------------------<  155<H>  4.5   |  24  |  1.16    Ca    9.3      01 Sep 2023 07:01    TPro  8.0  /  Alb  3.5  /  TBili  0.3  /  DBili  x   /  AST  53<H>  /  ALT  145<H>  /  AlkPhos  346<H>  09-01    LIVER FUNCTIONS - ( 01 Sep 2023 07:01 )  Alb: 3.5 g/dL / Pro: 8.0 g/dL / ALK PHOS: 346 U/L / ALT: 145 U/L / AST: 53 U/L / GGT: x             Urinalysis Basic - ( 01 Sep 2023 07:01 )    Color: x / Appearance: x / SG: x / pH: x  Gluc: 155 mg/dL / Ketone: x  / Bili: x / Urobili: x   Blood: x / Protein: x / Nitrite: x   Leuk Esterase: x / RBC: x / WBC x   Sq Epi: x / Non Sq Epi: x / Bacteria: x                              12.5   9.40  )-----------( 562      ( 01 Sep 2023 07:02 )             39.6                         12.0   12.70 )-----------( 533      ( 30 Aug 2023 05:18 )             36.8       Imaging personally reviewed by me:

## 2023-09-01 NOTE — PROGRESS NOTE ADULT - ASSESSMENT
69 y/o M w/ no significant PMhs who presented w/ epigastric abd pain x 2 weeks w/ associated fevers, night sweats, poor appetite.    hepatic abscesses, fever  leukocytosis, elevated liver enzymes  CT abd- complex hepatic cystic lesion measuring 5.8 cm with closer sign, suggestive of an abscess. additional cystic lesions in the left hepatic lobe measuring up to 3.2 cm likely additional abscesses.  s/p IR drain placement 8/29, culture no growth  blood cultures- NGTD  afebrile x >48 hours, leukocytosis resolved    Recommendations  switch zosyn to augmentin 875-125mg bid   plan for 2 week course of antibiotics from drain placement w/ last day 9/11 pending repeat imaging outpt  f/u with IR on 9/7  f/u in [facility] office on 9/8    Dinh Stevens M.D.  [facility], Division of Infectious Diseases  [phone]  After 5pm on weekdays and all day on weekends - please call [phone]  67 y/o M w/ no significant PMhs who presented w/ epigastric abd pain x 2 weeks w/ associated fevers, night sweats, poor appetite.    hepatic abscesses, fever  leukocytosis, elevated liver enzymes  CT abd- complex hepatic cystic lesion measuring 5.8 cm with closer sign, suggestive of an abscess. additional cystic lesions in the left hepatic lobe measuring up to 3.2 cm likely additional abscesses.  s/p IR drain placement 8/29, culture no growth  blood cultures- NGTD  afebrile x >48 hours, leukocytosis resolved    Recommendations  switch zosyn to augmentin 875-125mg bid   plan for 2 week course of antibiotics from drain placement w/ last day 9/11 pending repeat imaging outpt  f/u with IR on 9/7  f/u in [facility] ID office on 9/8    discussed w/ medicine attending  Dinh Stevens M.D.  [facility], Division of Infectious Diseases  [phone]  After 5pm on weekdays and all day on weekends - please call [phone]

## 2023-09-01 NOTE — DISCHARGE NOTE NURSING/CASE MANAGEMENT/SOCIAL WORK - NSDCPEFALRISK_GEN_ALL_CORE
For information on Fall & Injury Prevention, visit: [email or web address] OR  [email or web address] OR  https://www.cdc.gov/steadi/patient.html

## 2023-09-01 NOTE — DISCHARGE NOTE NURSING/CASE MANAGEMENT/SOCIAL WORK - PATIENT PORTAL LINK FT
You can access the FollowMyHealth Patient Portal offered by [facility] by registering at the following website: [email or web address]. By joining [facility]’s FollowMyHealth portal, you will also be able to view your health information using other applications (apps) compatible with our system.

## 2023-09-01 NOTE — DISCHARGE NOTE NURSING/CASE MANAGEMENT/SOCIAL WORK - NSDCFUADDAPPT_GEN_ALL_CORE_FT
You have an appointment with IR on 9/7/23 @12:30pm for a cat scan and drain study. Should you need to reschedule you may call the IR booking office @ [phone]. Please feel free to contact us at [phone] if any problems arise. After 6PM, Monday through Friday, on weekends and on holidays, please call [phone] and ask for the radiology resident on call to be paged.

## 2023-09-01 NOTE — PROGRESS NOTE ADULT - PROVIDER SPECIALTY LIST ADULT
Gastroenterology
Gastroenterology
HPV is negative and patient had a hysterectomy (no cervix)  No further follow up needed at this time.  Jia Lynne PA-C 11/18/2022 11:25 AM   
Infectious Disease
Gastroenterology
Internal Medicine
Internal Medicine
Gastroenterology
Infectious Disease
Infectious Disease
Internal Medicine
Internal Medicine
Intervent Radiology
Gastroenterology
Infectious Disease
Internal Medicine
Internal Medicine

## 2023-09-01 NOTE — PROGRESS NOTE ADULT - SUBJECTIVE AND OBJECTIVE BOX
[facility], Division of Infectious Diseases  DIAMOND Moore Y. Patel, S. Shah, G. Rodney  [phone]  ([phone] - weekdays after 5pm and weekends)    Name: JADA EDWARDS  Age/Gender: 68y Male  MRN: [id number]    Interval History:  Notes reviewed.   No concerning overnight events.  Afebrile.   no abd pain or diarrhea    Allergies: No Known Allergies      Objective:  Vitals:   T(F): 97.9 (08-31-23 @ 23:08), Max: 98 (08-31-23 @ 15:31)  HR: 83 (08-31-23 @ 23:08) (83 - 91)  BP: 110/70 (08-31-23 @ 23:08) (110/70 - 111/70)  RR: 18 (08-31-23 @ 23:08) (18 - 18)  SpO2: 94% (08-31-23 @ 23:08) (94% - 96%)  Physical Examination:  General: no acute distress  HEENT: anicteric  Cardio: S1, S2, normal rate  Resp: clear to auscultation bilaterally  Abd: soft, NT, ND, RFITZ drain w/ small amount of yellow fluid  Ext: no LE edema  Skin: warm, dry    Laboratory Studies:  CBC:                       12.5   9.40  )-----------( 562      ( 01 Sep 2023 07:02 )             39.6     WBC Trend:  9.40 09-01-23 @ 07:02  12.70 08-30-23 @ 05:18  14.93 08-29-23 @ 07:29  14.58 08-28-23 @ 07:03  15.88 08-27-23 @ 08:06  15.10 08-27-23 @ 07:09  15.82 08-25-23 @ 17:08    CMP: 09-01    135  |  97  |  16  ----------------------------<  155<H>  4.5   |  24  |  1.16    Ca    9.3      01 Sep 2023 07:01    TPro  8.0  /  Alb  3.5  /  TBili  0.3  /  DBili  x   /  AST  53<H>  /  ALT  145<H>  /  AlkPhos  346<H>  09-01      LIVER FUNCTIONS - ( 01 Sep 2023 07:01 )  Alb: 3.5 g/dL / Pro: 8.0 g/dL / ALK PHOS: 346 U/L / ALT: 145 U/L / AST: 53 U/L / GGT: x             Urinalysis Basic - ( 01 Sep 2023 07:01 )    Color: x / Appearance: x / SG: x / pH: x  Gluc: 155 mg/dL / Ketone: x  / Bili: x / Urobili: x   Blood: x / Protein: x / Nitrite: x   Leuk Esterase: x / RBC: x / WBC x   Sq Epi: x / Non Sq Epi: x / Bacteria: x      Microbiology: reviewed     Culture - Body Fluid with Gram Stain (collected 08-29-23 @ 19:42)  Source: .Body Fluid liver abscess fluid  Gram Stain (08-30-23 @ 02:26):    Numerous polymorphonuclear leukocytes seen per low power field    No organisms seen per oil power field    Limited volume of specimen received, Unable to centrifuge/concentrate    specimen. Culture results may be compromised.  Preliminary Report (08-30-23 @ 22:32):    No growth    Culture - Urine (collected 08-25-23 @ 19:54)  Source: Clean Catch Clean Catch (Midstream)  Final Report (08-26-23 @ 21:25):    <10,000 CFU/mL Normal Urogenital Sunita    Culture - Blood (collected 08-25-23 @ 16:55)  Source: .Blood Blood-Peripheral  Final Report (08-30-23 @ 22:00):    No growth at 5 days    Culture - Blood (collected 08-25-23 @ 16:40)  Source: .Blood Blood-Peripheral  Final Report (08-30-23 @ 22:00):    No growth at 5 days        Radiology: reviewed     Medications:  amoxicillin  875 milliGRAM(s)/clavulanate 1 Tablet(s) Oral every 12 hours  chlorhexidine 2% Cloths 1 Application(s) Topical daily    Antimicrobials:  amoxicillin  875 milliGRAM(s)/clavulanate 1 Tablet(s) Oral every 12 hours

## 2023-09-01 NOTE — PROGRESS NOTE ADULT - ASSESSMENT
1. Liver abscess s/p drain  abx per ID   IR f/u planned for 09/07    2. Colonic diverticulosis. No e/o diverticulitis.    3. diarrhea. started after abx were initiated. seems to be improving. monitor for now.

## 2023-09-01 NOTE — PROGRESS NOTE ADULT - REASON FOR ADMISSION
fever, night sweats, epigastric abd pain

## 2023-09-03 LAB
CULTURE RESULTS: SIGNIFICANT CHANGE UP
SPECIMEN SOURCE: SIGNIFICANT CHANGE UP

## 2023-09-05 LAB — E HISTOLYT AB SER-ACNC: NEGATIVE — SIGNIFICANT CHANGE UP

## 2023-09-07 ENCOUNTER — OUTPATIENT (OUTPATIENT)
Dept: OUTPATIENT SERVICES | Facility: HOSPITAL | Age: 68
LOS: 1 days | End: 2023-09-07
Payer: COMMERCIAL

## 2023-09-07 ENCOUNTER — APPOINTMENT (OUTPATIENT)
Dept: CT IMAGING | Facility: HOSPITAL | Age: 68
End: 2023-09-07

## 2023-09-07 ENCOUNTER — RESULT REVIEW (OUTPATIENT)
Age: 68
End: 2023-09-07

## 2023-09-07 ENCOUNTER — TRANSCRIPTION ENCOUNTER (OUTPATIENT)
Age: 68
End: 2023-09-07

## 2023-09-07 VITALS
SYSTOLIC BLOOD PRESSURE: 125 MMHG | OXYGEN SATURATION: 99 % | HEART RATE: 79 BPM | TEMPERATURE: 98 F | RESPIRATION RATE: 16 BRPM | DIASTOLIC BLOOD PRESSURE: 87 MMHG

## 2023-09-07 DIAGNOSIS — K75.0 ABSCESS OF LIVER: ICD-10-CM

## 2023-09-07 PROCEDURE — 49424 ASSESS CYST CONTRAST INJECT: CPT

## 2023-09-07 PROCEDURE — 76080 X-RAY EXAM OF FISTULA: CPT | Mod: 26

## 2023-09-07 PROCEDURE — C1769: CPT

## 2023-09-07 PROCEDURE — 76080 X-RAY EXAM OF FISTULA: CPT

## 2023-09-07 RX ORDER — ATORVASTATIN CALCIUM 80 MG/1
1 TABLET, FILM COATED ORAL
Refills: 0 | DISCHARGE

## 2023-09-07 RX ORDER — ASPIRIN/CALCIUM CARB/MAGNESIUM 324 MG
1 TABLET ORAL
Refills: 0 | DISCHARGE

## 2023-09-07 RX ORDER — ASPIRIN/CALCIUM CARB/MAGNESIUM 324 MG
1 TABLET ORAL
Qty: 0 | Refills: 0 | DISCHARGE

## 2023-09-07 RX ORDER — ATORVASTATIN CALCIUM 80 MG/1
1 TABLET, FILM COATED ORAL
Qty: 0 | Refills: 0 | DISCHARGE

## 2023-09-07 NOTE — ASU DISCHARGE PLAN (ADULT/PEDIATRIC) - NURSING INSTRUCTIONS
Please feel free to contact us at [phone] if any questions or concerns arise. After 6PM on Monday through Friday (and weekends and holidays) please call [phone] and ask for the radiology resident on call to be paged..

## 2023-09-07 NOTE — ASU DISCHARGE PLAN (ADULT/PEDIATRIC) - ASU DC SPECIAL INSTRUCTIONSFT
Drain Check    Discharge Instructions  - You have had a drain checked.  - Keep the area clean and dry.  - Do not soak in a tub or pool with the drain, however you may shower with the drain and dressing covered in plastic wrap.  - Do not put traction on the drain and be careful that the drain does not get accidentally dislodged or kinked.  - Record output daily from the drain. Empty the bag as needed.  - You may resume your normal diet.  - You may resume your normal medications.  - It is normal to experience some pain over the site for the next few days. You may take Tylenol for that pain. Do not take more frequently than every 6 hours and do not exceed more than 3000mg of Tylenol in a 24 hour period.    Drain care:  -Disconnect tubing (tube attached to bag/ bulb)  from the catheter (catheter going into skin)  -Clean catheter with alcohol swab  -Twist on the flush syringe to the catheter (be sure to push the air out of syringe)  -Flush catheter with 5mL NS (DO NOT pull back on syringe). If you meet resistance upon flushing, STOP and contact IR.   -Disconnect syringe from catheter and reconnect drainage bag or bulb.    Dressing care:  -Wash hands thoroughly with water and soap for at least 20 seconds.   -take off old dressing and clean around drain site with gauze soaked with warm water  -After cleaning the site, dry and replace dressing with a new gauze and tegaderm.   -Place one piece of gauze underneath the drain and another piece of gauze on top of drain.   -Apply tegaderm (clear dressing) on top.  -Change dressing every 3 days or when soiled     Notify your primary physician and/or Interventional Radiology IMMEDIATELY if you experience any of the following       - Fever of 101F or 38C       - Chills or Rigors/ Shakes       - Swelling and/or Redness in the area of the puncture site       - Worsening Pain       - Blood soaked bandages or worsening bleeding       - Lightheadedness and/or dizziness upon standing       - Chest Pain/ Tightness       - Shortness of Breath       - Difficulty walking    If you have a problem that you believe requires IMMEDIATE attention, please go to your NEAREST Emergency Room. If you believe your problem can safely wait until you speak to a physician, please call Interventional Radiology for any concerns.    Please feel free to contact us at [phone] if any problems arise. After 6PM, Monday through Friday, on weekends and on holidays, please call [phone] and ask for the radiology resident on call to be paged. Drain Check and removal    Discharge Instructions  - Keep the area clean and dry.  - Do not soak in a tub or pool with the drain, however you may shower with the drain and dressing covered in plastic wrap.  - Do not put traction on the drain and be careful that the drain does not get accidentally dislodged or kinked.  - Record output daily from the drain. Empty the bag as needed.  - You may resume your normal diet.  - You may resume your normal medications.  - It is normal to experience some pain over the site for the next few days. You may take Tylenol for that pain. Do not take more frequently than every 6 hours and do not exceed more than 3000mg of Tylenol in a 24 hour period.    Dressing care:  -Wash hands thoroughly with water and soap for at least 20 seconds.   -take off old dressing and clean around drain site with gauze soaked with warm water  -After cleaning the site, dry and replace dressing with a new gauze and tegaderm.   -Place one piece of gauze underneath the drain and another piece of gauze on top of drain.   -Apply tegaderm (clear dressing) on top.  -Change dressing every 3 days or when soiled     Notify your primary physician and/or Interventional Radiology IMMEDIATELY if you experience any of the following       - Fever of 101F or 38C       - Chills or Rigors/ Shakes       - Swelling and/or Redness in the area of the puncture site       - Worsening Pain       - Blood soaked bandages or worsening bleeding       - Lightheadedness and/or dizziness upon standing       - Chest Pain/ Tightness       - Shortness of Breath       - Difficulty walking    If you have a problem that you believe requires IMMEDIATE attention, please go to your NEAREST Emergency Room. If you believe your problem can safely wait until you speak to a physician, please call Interventional Radiology for any concerns.    Please feel free to contact us at [phone] if any problems arise. After 6PM, Monday through Friday, on weekends and on holidays, please call [phone] and ask for the radiology resident on call to be paged. Drain Check and removal    Discharge Instructions  - Keep the area clean and dry.        - You may resume your normal diet.  - You may resume your normal medications.  - It is normal to experience some pain over the site for the next few days. You may take Tylenol for that pain. Do not take more frequently than every 6 hours and do not exceed more than 3000mg of Tylenol in a 24 hour period.       Notify your primary physician and/or Interventional Radiology IMMEDIATELY if you experience any of the following       - Fever of 101F or 38C       - Chills or Rigors/ Shakes       - Swelling and/or Redness in the area of the puncture site       - Worsening Pain       - Blood soaked bandages or worsening bleeding       - Lightheadedness and/or dizziness upon standing       - Chest Pain/ Tightness       - Shortness of Breath       - Difficulty walking    If you have a problem that you believe requires IMMEDIATE attention, please go to your NEAREST Emergency Room. If you believe your problem can safely wait until you speak to a physician, please call Interventional Radiology for any concerns.    Please feel free to contact us at [phone] if any problems arise. After 6PM, Monday through Friday, on weekends and on holidays, please call [phone] and ask for the radiology resident on call to be paged.

## 2023-09-07 NOTE — PRE PROCEDURE NOTE - PRE PROCEDURE EVALUATION
Interventional Radiology    HPI:  68M no PMH with abdominal pain and fevers, leukocytosis, found to have likely abscess on CT. s/p hepatic abscess drainage on 8/29/23 in Interventional Radiology with Dr Jensen. Patient presents today for drain evaluation. He reports 10-15cc of daily output. States yesterday noting pericatheter leakage upon flushing. Denies abdominal pain, fever, chills,n/v.     Allergies: No Known Allergies    Medications (Abx/Cardiac/Anticoagulation/Blood Products)      Data:    T(C): 36.5  HR: 79  BP: 125/87  RR: 16  SpO2: 99%    Exam  General: No acute distress, A&Ox3  Chest: Non labored breathing      Imaging:     Plan: 68y Male presents for Hepatic abscess drain study   -Risks/Benefits/alternatives explained with the patient and/or healthcare proxy and witnessed informed consent obtained.

## 2023-09-16 ENCOUNTER — OUTPATIENT (OUTPATIENT)
Dept: OUTPATIENT SERVICES | Facility: HOSPITAL | Age: 68
LOS: 1 days | End: 2023-09-16
Payer: COMMERCIAL

## 2023-09-16 ENCOUNTER — TRANSCRIPTION ENCOUNTER (OUTPATIENT)
Age: 68
End: 2023-09-16

## 2023-09-16 ENCOUNTER — APPOINTMENT (OUTPATIENT)
Dept: CT IMAGING | Facility: CLINIC | Age: 68
End: 2023-09-16
Payer: COMMERCIAL

## 2023-09-16 DIAGNOSIS — K75.0 ABSCESS OF LIVER: ICD-10-CM

## 2023-09-16 PROBLEM — E78.5 HYPERLIPIDEMIA, UNSPECIFIED: Chronic | Status: ACTIVE | Noted: 2023-08-25

## 2023-09-16 PROCEDURE — 74177 CT ABD & PELVIS W/CONTRAST: CPT | Mod: 26

## 2023-09-16 PROCEDURE — 74177 CT ABD & PELVIS W/CONTRAST: CPT

## 2024-01-24 NOTE — ED ADULT TRIAGE NOTE - CHIEF COMPLAINT QUOTE
Ben Girard(PA) low grade temps back pain epigastric pain x3w saw pmd sent for scans found liver mass

## 2024-08-13 ENCOUNTER — TRANSCRIPTION ENCOUNTER (OUTPATIENT)
Age: 69
End: 2024-08-13

## 2024-08-14 ENCOUNTER — TRANSCRIPTION ENCOUNTER (OUTPATIENT)
Age: 69
End: 2024-08-14

## 2024-10-11 NOTE — ED PROVIDER NOTE - NS ED MD DISPO ISOLATION TYPES
Pt arrives to unit per self for NST.Pt shown to TR- 05 oriented to room.  reports positive FM, denies bleeding or leaking of fluid, POC discussed, call light within reach           None